# Patient Record
Sex: MALE | Race: WHITE | NOT HISPANIC OR LATINO | Employment: FULL TIME | URBAN - METROPOLITAN AREA
[De-identification: names, ages, dates, MRNs, and addresses within clinical notes are randomized per-mention and may not be internally consistent; named-entity substitution may affect disease eponyms.]

---

## 2017-11-06 ENCOUNTER — OFFICE VISIT (OUTPATIENT)
Dept: FAMILY MEDICINE | Facility: CLINIC | Age: 52
End: 2017-11-06
Payer: COMMERCIAL

## 2017-11-06 VITALS
OXYGEN SATURATION: 98 % | HEART RATE: 85 BPM | SYSTOLIC BLOOD PRESSURE: 124 MMHG | TEMPERATURE: 98.5 F | DIASTOLIC BLOOD PRESSURE: 80 MMHG | BODY MASS INDEX: 25.16 KG/M2 | WEIGHT: 179.7 LBS | HEIGHT: 71 IN

## 2017-11-06 DIAGNOSIS — G25.81 RESTLESS LEG SYNDROME: ICD-10-CM

## 2017-11-06 DIAGNOSIS — Z82.49 FAMILY HISTORY OF ISCHEMIC HEART DISEASE: Primary | ICD-10-CM

## 2017-11-06 PROCEDURE — 99213 OFFICE O/P EST LOW 20 MIN: CPT | Performed by: NURSE PRACTITIONER

## 2017-11-06 RX ORDER — GABAPENTIN 100 MG/1
CAPSULE ORAL
Qty: 90 CAPSULE | Refills: 1 | Status: SHIPPED | OUTPATIENT
Start: 2017-11-06 | End: 2018-05-18

## 2017-11-06 NOTE — PATIENT INSTRUCTIONS
Understanding Restless Legs Syndrome    Are you ever annoyed by a creeping or itching feeling in your legs? Do you often feel an urge to move your legs while sitting or lying in bed? This can keep you from falling asleep at night. You may then feel tired during the day. If you have these problems, talk to your health care provider. He or she can suggest a treatment plan and help you find ways to sleep better.  Restless legs syndrome (RLS)  RLS is a creeping, crawly, or jumpy feeling in the legs with an urge to move them. Symptoms of RLS often occur during periods of inactivity, such as when you sit or lie down at night. This discomfort can keep you from falling asleep. RLS is more common in older people and tends to run in families. Overuse of caffeine or alcohol may make symptoms worse. Iron deficiency, diabetes, or kidney problems can contribute to RLS.  Periodic limb movement syndrome (PLMS)  PLMS is sudden, repetitive leg jerking during sleep. The person you sleep with is often the one who notices it. Your legs may jerk many times during the night. You and your partner may both have trouble sleeping and feel tired in the morning. PLMS shouldn t be confused with the normal leg or body twitching many people have when first falling asleep.  Treating these problems  If these problems are causing disrupted sleep and daytime symptoms, treatment may be needed. Possible treatments may include:    Avoiding medications like antidepressants, antinausea medications, and antipsychotic medications.     Prescribed medications.    Lifestyle changes, such as controlling caffeine intake, alcohol, and smoking.  Date Last Reviewed: 7/18/2015 2000-2017 [employer]. [address] [place] [address], [place] [address]. All rights reserved. This information is not intended as a substitute for professional medical care. Always follow your healthcare professional's instructions.

## 2017-11-06 NOTE — PROGRESS NOTES
SUBJECTIVE:   Miller Burkett is a 52 year old male who presents to clinic today for the following health issues:    Problem list and histories reviewed & adjusted, as indicated.    Has symptoms of needing to move legs before sleep. Has noticed this since he was a child, but thinks it has gotten worse in the past few years. He is very physically active, and typically stretches after exercise. Aches in calves in the evenings.Tries to avoid alcohol in the evenings as he has noticed this makes things a bit worse as well. Notices that occasionally he feels lik he needs to move his legs during the night when he wakes up.     His father and two brothers have CAD. He has not had any symptoms of shortness of breath, chest pain or pressure, irregular heart rate. His brother was also not symptomatic, but was diagnosed with CAD due to coronary calcium scoring- he is wondering if he could be evaluated in this way as well.   Additional history: as documented    Patient Active Problem List   Diagnosis     CARDIOVASCULAR SCREENING; LDL GOAL LESS THAN 160     Family history of cardiovascular disease     Past Surgical History:   Procedure Laterality Date     C APPENDECTOMY      age 13       Social History   Substance Use Topics     Smoking status: Never Smoker     Smokeless tobacco: Never Used     Alcohol use Yes      Comment: 2-3 drinks per wk     Family History   Problem Relation Age of Onset     Hypertension Father      C.A.D. Father      quintuple bypass (age 53); pacemaker     Anesthesia Reaction Father      Coronary Artery Disease Father      Arthritis Father      Hyperlipidemia Father      Asthma Mother      Thyroid Disease Mother      Arthritis Mother      MENTAL ILLNESS Mother      Coronary Artery Disease Paternal Grandfather      Asthma Brother      Coronary Artery Disease Brother      diagnosed with CAD with calcium scoring     MENTAL ILLNESS Brother      Asthma Sister      Arthritis Sister      Hyperlipidemia Sister   "    MENTAL ILLNESS Sister      ALETHEA. Brother      Alcohol/Drug Sister      CANCER Paternal Aunt      brain     CANCER Paternal Uncle      lung     Depression Sister      poss bi-polar     Anxiety Disorder Sister      RADHAAGLADIS. Sister      arrhythmia     OSTEOPOROSIS Sister              Reviewed and updated as needed this visit by clinical staff       Reviewed and updated as needed this visit by Provider         ROS:  Constitutional, HEENT, cardiovascular, pulmonary, gi and gu systems are negative, except as otherwise noted.      OBJECTIVE:   /80 (BP Location: Right arm, Patient Position: Sitting, Cuff Size: Adult Regular)  Pulse 85  Temp 98.5  F (36.9  C) (Oral)  Ht 5' 11\" (1.803 m)  Wt 179 lb 11.2 oz (81.5 kg)  SpO2 98%  BMI 25.06 kg/m2  Body mass index is 25.06 kg/(m^2).   GENERAL: healthy, alert and no distress  NECK: no adenopathy, no asymmetry, masses, or scars and thyroid normal to palpation  RESP: lungs clear to auscultation - no rales, rhonchi or wheezes  CV: regular rate and rhythm, normal S1 S2, no S3 or S4, no murmur, click or rub, no peripheral edema and peripheral pulses strong  ABDOMEN: soft, nontender, no hepatosplenomegaly, no masses and bowel sounds normal  MS: no gross musculoskeletal defects noted, no edema    Diagnostic Test Results:  none     ASSESSMENT/PLAN:     Problem List Items Addressed This Visit     None      Visit Diagnoses     Family history of ischemic heart disease    -  Primary    Relevant Orders    CARDIOLOGY EVAL ADULT REFERRAL    Restless leg syndrome        Relevant Medications    gabapentin (NEURONTIN) 100 MG capsule             "

## 2017-11-06 NOTE — MR AVS SNAPSHOT
After Visit Summary   11/6/2017    Miller Burkett    MRN: [id number]           Patient Information     Date Of Birth          1965        Visit Information        Provider Department      11/6/2017 3:00 PM Yvonne Franco APRN [facility]        Today's Diagnoses     Family history of ischemic heart disease    -  1    Restless leg syndrome          Care Instructions      Understanding Restless Legs Syndrome    Are you ever annoyed by a creeping or itching feeling in your legs? Do you often feel an urge to move your legs while sitting or lying in bed? This can keep you from falling asleep at night. You may then feel tired during the day. If you have these problems, talk to your health care provider. He or she can suggest a treatment plan and help you find ways to sleep better.  Restless legs syndrome (RLS)  RLS is a creeping, crawly, or jumpy feeling in the legs with an urge to move them. Symptoms of RLS often occur during periods of inactivity, such as when you sit or lie down at night. This discomfort can keep you from falling asleep. RLS is more common in older people and tends to run in families. Overuse of caffeine or alcohol may make symptoms worse. Iron deficiency, diabetes, or kidney problems can contribute to RLS.  Periodic limb movement syndrome (PLMS)  PLMS is sudden, repetitive leg jerking during sleep. The person you sleep with is often the one who notices it. Your legs may jerk many times during the night. You and your partner may both have trouble sleeping and feel tired in the morning. PLMS shouldn t be confused with the normal leg or body twitching many people have when first falling asleep.  Treating these problems  If these problems are causing disrupted sleep and daytime symptoms, treatment may be needed. Possible treatments may include:    Avoiding medications like antidepressants, antinausea medications, and antipsychotic medications.     Prescribed  medications.    Lifestyle changes, such as controlling caffeine intake, alcohol, and smoking.  Date Last Reviewed: 7/18/2015 2000-2017 [employer]. [address] [place] [address], [place] [address]. All rights reserved. This information is not intended as a substitute for professional medical care. Always follow your healthcare professional's instructions.                Follow-ups after your visit        Additional Services     CARDIOLOGY EVAL ADULT REFERRAL       Your provider has referred you to:  [facility] ([phone]) [email or web address]  [facility]: [place] [phone]   [email or web address]/[facility]: [facility] [phone]   [email or web address]/[facility]: [facility] [phone]   https://[email or web address]/[facility]: [facility] [phone]   [email or web address]/[facility]: [facility] [phone]   [email or web address]://[email or web address]/[facility]  [facility]: [facility] CVD Prevention- [place] [phone] [email or web address] [facility]-for-cardiovascular-disease-prevention  [facility]: [facility] [phone]   [email or web address]    Please be aware that coverage of these services is subject to the terms and limitations of your health insurance plan.  Call member services at your health plan with any benefit or coverage questions.      Type of Referral:  Heart Prevention Screening ([facility])    Timeframe requested:  Within 1  "month    Please bring the following to your appointment:  >>   Any x-rays, CTs or MRIs which have been performed.  Contact the facility where they were done to arrange for  prior to your scheduled appointment.    >>   List of current medications  >>   This referral request   >>   Any documents/labs given to you for this referral                  Who to contact     If you have questions or need follow up information about today's clinic visit or your schedule please contact [facility] directly at [phone].  Normal or non-critical lab and imaging results will be communicated to you by [employer], letter or phone within 4 business days after the clinic has received the results. If you do not hear from us within 7 days, please contact the clinic through [employer] or phone. If you have a critical or abnormal lab result, we will notify you by phone as soon as possible.  Submit refill requests through [employer] or call your pharmacy and they will forward the refill request to us. Please allow 3 business days for your refill to be completed.          Additional Information About Your Visit        [employer] Information     [employer] gives you secure access to your electronic health record. If you see a primary care provider, you can also send messages to your care team and make appointments. If you have questions, please call your primary care clinic.  If you do not have a primary care provider, please call [phone] and they will assist you.        Care EveryWhere ID     This is your Care EveryWhere ID. This could be used by other organizations to access your [place] medical records  [id number]        Your Vitals Were     Pulse Temperature Height Pulse Oximetry BMI (Body Mass Index)       85 98.5  F (36.9  C) (Oral) 5' 11\" (1.803 m) 98% 25.06 kg/m2        Blood Pressure from Last 3 Encounters:   11/06/17 124/80   02/04/16 125/77   01/14/09 116/58    Weight from Last 3 Encounters:   11/06/17 179 lb 11.2 " oz (81.5 kg)   02/04/16 182 lb 12.8 oz (82.9 kg)   01/14/09 179 lb 4 oz (81.3 kg)              We Performed the Following     CARDIOLOGY EVAL ADULT REFERRAL          Today's Medication Changes          These changes are accurate as of: 11/6/17  3:45 PM.  If you have any questions, ask your nurse or doctor.               Start taking these medicines.        Dose/Directions    gabapentin 100 MG capsule   Commonly known as:  NEURONTIN   Used for:  Restless leg syndrome   Started by:  Yvonne Franco APRN CNP        Take 1-3 tablets two hours before bedtime   Quantity:  90 capsule   Refills:  1            Where to get your medicines      These medications were sent to [facility] [address] [place] [address] & [address] [address]    Hours:  24-hours Phone:  [phone]     gabapentin 100 MG capsule                Primary Care Provider Office Phone # Fax #    Woodrow Ryan [phone]       [facility] [address] [place] [address]        Equal Access to Services     [facility] AH: Hadii aad ku hadasho Soomaali, waaxda luqadaha, qaybta kaalmada ademarceloyada, griffin martinez. So [facility] [phone].    ATENCIÓN: Si habla español, tiene a aj disposición servicios gratuitos de asistencia lingüística. [facility] [phone].    We comply with applicable federal civil rights laws and [place] laws. We do not discriminate on the basis of race, color, national origin, age, disability, sex, sexual orientation, or gender identity.            Thank you!     Thank you for choosing [facility]  for your care. Our goal is always to provide you with excellent care. Hearing back from our patients is one way we can continue to improve our services. Please take a few minutes to complete the written survey that you may receive in the mail after your visit with us. Thank  you!             Your Updated Medication List - Protect others around you: Learn how to safely use, store and throw away your medicines at www.disposemymeds.org.          This list is accurate as of: 11/6/17  3:45 PM.  Always use your most recent med list.                   Brand Name Dispense Instructions for use Diagnosis    gabapentin 100 MG capsule    NEURONTIN    90 capsule    Take 1-3 tablets two hours before bedtime    Restless leg syndrome       NO ACTIVE MEDICATIONS      .

## 2017-11-29 ENCOUNTER — OFFICE VISIT (OUTPATIENT)
Dept: CARDIOLOGY | Facility: CLINIC | Age: 52
End: 2017-11-29
Payer: COMMERCIAL

## 2017-11-29 VITALS
OXYGEN SATURATION: 98 % | WEIGHT: 181.2 LBS | BODY MASS INDEX: 25.27 KG/M2 | SYSTOLIC BLOOD PRESSURE: 120 MMHG | HEART RATE: 56 BPM | DIASTOLIC BLOOD PRESSURE: 70 MMHG

## 2017-11-29 DIAGNOSIS — Z82.49 FAMILY HISTORY OF CORONARY ARTERIOSCLEROSIS: ICD-10-CM

## 2017-11-29 DIAGNOSIS — Z13.6 CARDIOVASCULAR SCREENING; LDL GOAL LESS THAN 160: Primary | ICD-10-CM

## 2017-11-29 PROCEDURE — 93000 ELECTROCARDIOGRAM COMPLETE: CPT | Performed by: INTERNAL MEDICINE

## 2017-11-29 PROCEDURE — 99204 OFFICE O/P NEW MOD 45 MIN: CPT | Performed by: INTERNAL MEDICINE

## 2017-11-29 NOTE — LETTER
11/29/2017    Yvonne Allysury Franco, APRN CNP  [address]  [place] [address]    RE: Miller Burkett       Dear Colleague,    I had the pleasure of seeing Miller Burkett in the [facility].    CARDIOLOGY CONSULT    REASON FOR CONSULT: Family history of urinary artery disease    PRIMARY CARE PHYSICIAN:  Woodrow Ryan    HISTORY OF PRESENT ILLNESS:  52-year-old male with no cardiac history is seen for cardiovascular screening.  He is a lifelong nonsmoker, he has no high blood pressure or dyslipidemia.    He has a strong family history of coronary artery disease.  His father had a CABG at age 53.  He has 2 brothers who are found of an elevated calcium score, 1 underwent a stent.    Patient is very physically active.  He does triathelons and will generally exercises 4 times per week for about one hour.  He has not had any exertional shortness of breath or chest pain.  He denies any decreased functional capacity recently with his exercise.  He generally tries to eat a healthy diet with minimizing fatty food and red meats.  He does not check his blood pressure or heart rate outside of clinic.    PAST MEDICAL HISTORY:  No significant past medical history    MEDICATIONS:  Current Outpatient Prescriptions   Medication     gabapentin (NEURONTIN) 100 MG capsule     NO ACTIVE MEDICATIONS     No current facility-administered medications for this visit.        ALLERGIES:  Allergies   Allergen Reactions     No Known Drug Allergies      Seasonal Allergies        SOCIAL HISTORY:  I have reviewed this patient's social history and updated it with pertinent information if needed. Miller Burkett  reports that he has never smoked. He has never used smokeless tobacco. He reports that he drinks alcohol. He reports that he does not use illicit drugs.    FAMILY HISTORY:  I have reviewed this patient's family history and updated it with pertinent information if needed.   Family History   Problem  Relation Age of Onset     Hypertension Father      C.A.D. Father      quintuple bypass (age 53); pacemaker     Anesthesia Reaction Father      Coronary Artery Disease Father      Arthritis Father      Hyperlipidemia Father      Asthma Mother      Thyroid Disease Mother      Arthritis Mother      MENTAL ILLNESS Mother      Coronary Artery Disease Paternal Grandfather      Asthma Brother      Coronary Artery Disease Brother      diagnosed with CAD with calcium scoring     MENTAL ILLNESS Brother      Asthma Sister      Arthritis Sister      Hyperlipidemia Sister      MENTAL ILLNESS Sister      C.A.D. Brother      Alcohol/Drug Sister      CANCER Paternal Aunt      brain     CANCER Paternal Uncle      lung     Depression Sister      poss bi-polar     Anxiety Disorder Sister      C.A.D. Sister      arrhythmia     OSTEOPOROSIS Sister        REVIEW OF SYSTEMS:  Constitutional:  No weight loss, fever, chills, weakness or fatigue.  HEENT:  Eyes:  No visual loss, blurred vision, double vision or yellow sclerae. No hearing loss, sneezing, congestion, runny nose or sore throat.  Skin:  No rash or itching.  Cardiovascular: per HPI  Respiratory: per HPI  GI:  No anorexia, nausea, vomiting or diarrhea. No abdominal pain or blood.  :  No dysurea, hematuria  Neurologic:  No headache, dizziness, syncope, paralysis, ataxia, numbness or tingling in the extremities. No change in bowel or bladder control.  Musculoskeletal:  No muscle, back pain, joint pain or stiffness.  Hematologic:  No anemia, bleeding or bruising.  Lymphatics:  No enlarged nodes. No history of splenectomy.  Psychiatric:  No history of depression or anxiety.  Endocrine:  No reports of sweating, cold or heat intolerance. No polyuria or polydipsia.  Allergies:  No history of asthma, hives, eczema or rhinitis.    PHYSICAL EXAM:  /70 (BP Location: Right arm, Patient Position: Chair, Cuff Size: Adult Regular)  Pulse 56  Wt 82.2 kg (181 lb 3.2 oz)  SpO2 98%  BMI  25.27 kg/m2  Constitutional: awake, alert, no distress  Eyes: PERRL, sclera nonicteric  ENT: trachea midline  Respiratory: Lungs clear  Cardiovascular: Mild bradycardia, regular, no murmurs  GI: nondistended, nontender, bowel sounds present  Lymph/Hematologic: no lymphadenopathy  Skin: dry, no rash  Musculoskeletal: good muscle tone, strength 5/5 in upper and lower extremities  Neurologic: no focal deficits  Neuropsychiatric: appropriate affact    DATA:  Labs:   February 2016: Cholesterol 180, triglycerides 80, HDL 60,      EKG: November 29, 2017: Sinus bradycardia, rate 52, normal axis and intervals, no Q waves    ASSESSMENT:  52-year-old male with no cardiac history is seen for cardiovascular screening.  He has a very strong family history of premature coronary artery disease in his father and 2 brothers.  Patient is very physically active and has no concerning cardiac symptoms.  He has no other risk factors such as hypertension, diabetes, or dyslipidemia.    A coronary calcium scan will be done as a starting point to evaluate for coronary disease.  If it comes back quite high, such as over 400, or at a very high percentile, additional testing such as a stress test or coronary CTA would be reasonable.    He should probably be on low-dose aspirin.  We also discussed that if his calcium score is elevated, there would be a benefit being on a statin, even though his lipids look quite good.  He seems to already eat a fairly healthy diet.      RECOMMENDATIONS:  1.  Strong family history of coronary artery disease  - Coronary calcium score  - If score is quite high, such as over 400 above the 75th percentile, consider stress testing or coronary CTA  - Start aspirin 81 mg daily  - If calcium score is high, would recommend moderate dose statin such as atorvastatin 20 mg   - Encouraged Mediterranean-style diet     Follow-up as needed based on test results.    Hitesh De Santiago MD  Cardiology - [facility]  Pager:   [phone]  Text Page  November 29, 2017    Thank you for allowing me to participate in the care of your patient.    Sincerely,     Hitesh De Santiago MD     [facility]    cc:   Woodrow Ryan MD  [facility] [place]   [address] [place] [address]

## 2017-11-29 NOTE — NURSING NOTE
"Chief Complaint   Patient presents with     Consult     new pt for cardiovascular screening,family hx CAD       Initial /70 (BP Location: Right arm, Patient Position: Chair, Cuff Size: Adult Regular)  Pulse 56  Wt 82.2 kg (181 lb 3.2 oz)  SpO2 98%  BMI 25.27 kg/m2 Estimated body mass index is 25.27 kg/(m^2) as calculated from the following:    Height as of 11/6/17: 1.803 m (5' 11\").    Weight as of this encounter: 82.2 kg (181 lb 3.2 oz).  Medication Reconciliation: complete   Charito Lam MA    "

## 2017-11-29 NOTE — MR AVS SNAPSHOT
"              After Visit Summary   11/29/2017    Miller Burkett    MRN: [id number]           Patient Information     Date Of Birth          1965        Visit Information        Provider Department      11/29/2017 10:15 AM Hitesh De Santiago MD [facility]   [facility]        Today's Diagnoses     CARDIOVASCULAR SCREENING; LDL GOAL LESS THAN 160    -  1    Family history of coronary arteriosclerosis          Care Instructions    Getting Ready for a CT Coronary Calcium Scan  What is this test?  A calcium scoring CT (computed tomography) scan is a painless, highly sensitive test that shows the amount of calcium build-up in your heart arteries. This tells us your future risk for heart attack.  Any plaque that has started to form in the heart arteries will show up on the CT.  A \"calcium score\" is then calculated and is compared to others of your age and gender.  This test does not show the percent blockage in any given artery, but rather a \"global burden\" of plaque.  If the calcium score is very high, then other testing such as stress testing is sometimes recommended.  Will my insurance cover it?  Please check with your insurance plan. This is a screening test, which may or may not be covered by insurance.   How do I get ready for my exam?  It is best to avoid caffeine on the day of your test.   The entire exam will take about 30 minutes or less.   Be sure to tell your doctor:    If there s any chance you are pregnant.     If you have any special needs.  What happens during the exam?  Please wear loose clothing, such as a sweat suit or jogging clothes. Avoid snaps, zippers and other metal. We may ask you to undress and put on a hospital gown.     You will lie on a table that will move through the scanner. The scanner is a short tube.    You will not be enclosed. The scan takes only a few minutes.    You must lie very still during the scans and follow breathing instructions.  Is it " safe?  As with any scan that uses radiation, there is a very small increased risk of cancer. Your doctor orders a scan when he or she feels the potential benefits outweigh the risks of the scan. Please talk with your doctor if you have any concerns before your exam.  When will I know the results?  You should discuss your test with your family doctor. He or she can go over the results with you. If needed, he or she can also suggest treatment or lifestyle changes.              Follow-ups after your visit        Future tests that were ordered for you today     Open Future Orders        Priority Expected Expires Ordered    CT Coronary Calcium Scan Routine 11/30/2017 11/29/2018 11/29/2017            Who to contact     If you have questions or need follow up information about today's clinic visit or your schedule please contact [facility] directly at [phone].  Normal or non-critical lab and imaging results will be communicated to you by [place] [email or web address], letter or phone within 4 business days after the clinic has received the results. If you do not hear from us within 7 days, please contact the clinic through [place] [email or web address] or phone. If you have a critical or abnormal lab result, we will notify you by phone as soon as possible.  Submit refill requests through Quofore or call your pharmacy and they will forward the refill request to us. Please allow 3 business days for your refill to be completed.          Additional Information About Your Visit        Quofore Information     Quofore gives you secure access to your electronic health record. If you see a primary care provider, you can also send messages to your care team and make appointments. If you have questions, please call your primary care clinic.  If you do not have a primary care provider, please call [phone] and they will assist you.        Care EveryWhere ID     This is your Care EveryWhere ID. This could be used by other  organizations to access your [facility] medical records  [id number]        Your Vitals Were     Pulse Pulse Oximetry BMI (Body Mass Index)             56 98% 25.27 kg/m2          Blood Pressure from Last 3 Encounters:   11/29/17 120/70   11/06/17 124/80   02/04/16 125/77    Weight from Last 3 Encounters:   11/29/17 82.2 kg (181 lb 3.2 oz)   11/06/17 81.5 kg (179 lb 11.2 oz)   02/04/16 82.9 kg (182 lb 12.8 oz)              We Performed the Following     EKG 12-lead complete w/read - Clinics (performed today)        Primary Care Provider Office Phone # Fax #    Woodrow Ryan [phone]       [facility] [place] [address] [place] [address]        Equal Access to Services     [facility] Center[facility]ENIA : Hadii anali hopkins hadasho Soomaali, waaxda luqadaha, qaybta kaalmada ademarceloyada, griffin roldan . So [facility] [phone].    ATENCIÓN: Si habla español, tiene a aj disposición servicios gratuitos de asistencia lingüística. Dashawn al [phone].    We comply with applicable federal civil rights laws and [place] laws. We do not discriminate on the basis of race, color, national origin, age, disability, sex, sexual orientation, or gender identity.            Thank you!     Thank you for choosing [facility]   [facility]  for your care. Our goal is always to provide you with excellent care. Hearing back from our patients is one way we can continue to improve our services. Please take a few minutes to complete the written survey that you may receive in the mail after your visit with us. Thank you!             Your Updated Medication List - Protect others around you: Learn how to safely use, store and throw away your medicines at www.disposemymeds.org.          This list is accurate as of: 11/29/17 11:05 AM.  Always use your most recent med list.                   Brand Name Dispense Instructions for use Diagnosis    gabapentin 100 MG capsule    NEURONTIN     90 capsule    Take 1-3 tablets two hours before bedtime    Restless leg syndrome       NO ACTIVE MEDICATIONS      .

## 2017-11-29 NOTE — PATIENT INSTRUCTIONS
"Getting Ready for a CT Coronary Calcium Scan  What is this test?  A calcium scoring CT (computed tomography) scan is a painless, highly sensitive test that shows the amount of calcium build-up in your heart arteries. This tells us your future risk for heart attack.  Any plaque that has started to form in the heart arteries will show up on the CT.  A \"calcium score\" is then calculated and is compared to others of your age and gender.  This test does not show the percent blockage in any given artery, but rather a \"global burden\" of plaque.  If the calcium score is very high, then other testing such as stress testing is sometimes recommended.  Will my insurance cover it?  Please check with your insurance plan. This is a screening test, which may or may not be covered by insurance.   How do I get ready for my exam?  It is best to avoid caffeine on the day of your test.   The entire exam will take about 30 minutes or less.   Be sure to tell your doctor:    If there s any chance you are pregnant.     If you have any special needs.  What happens during the exam?  Please wear loose clothing, such as a sweat suit or jogging clothes. Avoid snaps, zippers and other metal. We may ask you to undress and put on a hospital gown.     You will lie on a table that will move through the scanner. The scanner is a short tube.    You will not be enclosed. The scan takes only a few minutes.    You must lie very still during the scans and follow breathing instructions.  Is it safe?  As with any scan that uses radiation, there is a very small increased risk of cancer. Your doctor orders a scan when he or she feels the potential benefits outweigh the risks of the scan. Please talk with your doctor if you have any concerns before your exam.  When will I know the results?  You should discuss your test with your family doctor. He or she can go over the results with you. If needed, he or she can also suggest treatment or lifestyle changes.      "

## 2017-11-29 NOTE — PROGRESS NOTES
CARDIOLOGY CONSULT    REASON FOR CONSULT: Family history of urinary artery disease    PRIMARY CARE PHYSICIAN:  Woodrow Ryan    HISTORY OF PRESENT ILLNESS:  52-year-old male with no cardiac history is seen for cardiovascular screening.  He is a lifelong nonsmoker, he has no high blood pressure or dyslipidemia.    He has a strong family history of coronary artery disease.  His father had a CABG at age 53.  He has 2 brothers who are found of an elevated calcium score, 1 underwent a stent.    Patient is very physically active.  He does triathelons and will generally exercises 4 times per week for about one hour.  He has not had any exertional shortness of breath or chest pain.  He denies any decreased functional capacity recently with his exercise.  He generally tries to eat a healthy diet with minimizing fatty food and red meats.  He does not check his blood pressure or heart rate outside of clinic.    PAST MEDICAL HISTORY:  No significant past medical history    MEDICATIONS:  Current Outpatient Prescriptions   Medication     gabapentin (NEURONTIN) 100 MG capsule     NO ACTIVE MEDICATIONS     No current facility-administered medications for this visit.        ALLERGIES:  Allergies   Allergen Reactions     No Known Drug Allergies      Seasonal Allergies        SOCIAL HISTORY:  I have reviewed this patient's social history and updated it with pertinent information if needed. Miller Burkett  reports that he has never smoked. He has never used smokeless tobacco. He reports that he drinks alcohol. He reports that he does not use illicit drugs.    FAMILY HISTORY:  I have reviewed this patient's family history and updated it with pertinent information if needed.   Family History   Problem Relation Age of Onset     Hypertension Father      C.A.D. Father      quintuple bypass (age 53); pacemaker     Anesthesia Reaction Father      Coronary Artery Disease Father      Arthritis Father      Hyperlipidemia Father      Asthma  Mother      Thyroid Disease Mother      Arthritis Mother      MENTAL ILLNESS Mother      Coronary Artery Disease Paternal Grandfather      Asthma Brother      Coronary Artery Disease Brother      diagnosed with CAD with calcium scoring     MENTAL ILLNESS Brother      Asthma Sister      Arthritis Sister      Hyperlipidemia Sister      MENTAL ILLNESS Sister      C.A.D. Brother      Alcohol/Drug Sister      CANCER Paternal Aunt      brain     CANCER Paternal Uncle      lung     Depression Sister      poss bi-polar     Anxiety Disorder Sister      C.A.D. Sister      arrhythmia     OSTEOPOROSIS Sister        REVIEW OF SYSTEMS:  Constitutional:  No weight loss, fever, chills, weakness or fatigue.  HEENT:  Eyes:  No visual loss, blurred vision, double vision or yellow sclerae. No hearing loss, sneezing, congestion, runny nose or sore throat.  Skin:  No rash or itching.  Cardiovascular: per HPI  Respiratory: per HPI  GI:  No anorexia, nausea, vomiting or diarrhea. No abdominal pain or blood.  :  No dysurea, hematuria  Neurologic:  No headache, dizziness, syncope, paralysis, ataxia, numbness or tingling in the extremities. No change in bowel or bladder control.  Musculoskeletal:  No muscle, back pain, joint pain or stiffness.  Hematologic:  No anemia, bleeding or bruising.  Lymphatics:  No enlarged nodes. No history of splenectomy.  Psychiatric:  No history of depression or anxiety.  Endocrine:  No reports of sweating, cold or heat intolerance. No polyuria or polydipsia.  Allergies:  No history of asthma, hives, eczema or rhinitis.    PHYSICAL EXAM:  /70 (BP Location: Right arm, Patient Position: Chair, Cuff Size: Adult Regular)  Pulse 56  Wt 82.2 kg (181 lb 3.2 oz)  SpO2 98%  BMI 25.27 kg/m2  Constitutional: awake, alert, no distress  Eyes: PERRL, sclera nonicteric  ENT: trachea midline  Respiratory: Lungs clear  Cardiovascular: Mild bradycardia, regular, no murmurs  GI: nondistended, nontender, bowel sounds  present  Lymph/Hematologic: no lymphadenopathy  Skin: dry, no rash  Musculoskeletal: good muscle tone, strength 5/5 in upper and lower extremities  Neurologic: no focal deficits  Neuropsychiatric: appropriate affact    DATA:  Labs:   February 2016: Cholesterol 180, triglycerides 80, HDL 60,      EKG: November 29, 2017: Sinus bradycardia, rate 52, normal axis and intervals, no Q waves    ASSESSMENT:  52-year-old male with no cardiac history is seen for cardiovascular screening.  He has a very strong family history of premature coronary artery disease in his father and 2 brothers.  Patient is very physically active and has no concerning cardiac symptoms.  He has no other risk factors such as hypertension, diabetes, or dyslipidemia.    A coronary calcium scan will be done as a starting point to evaluate for coronary disease.  If it comes back quite high, such as over 400, or at a very high percentile, additional testing such as a stress test or coronary CTA would be reasonable.    He should probably be on low-dose aspirin.  We also discussed that if his calcium score is elevated, there would be a benefit being on a statin, even though his lipids look quite good.  He seems to already eat a fairly healthy diet.      RECOMMENDATIONS:  1.  Strong family history of coronary artery disease  - Coronary calcium score  - If score is quite high, such as over 400 above the 75th percentile, consider stress testing or coronary CTA  - Start aspirin 81 mg daily  - If calcium score is high, would recommend moderate dose statin such as atorvastatin 20 mg   - Encouraged Mediterranean-style diet     Follow-up as needed based on test results.    Hitesh De Santiago MD  Cardiology - [facility]  Pager:  [phone]  Text Page  November 29, 2017

## 2017-12-08 ENCOUNTER — HOSPITAL ENCOUNTER (OUTPATIENT)
Dept: CT IMAGING | Facility: CLINIC | Age: 52
Discharge: HOME | End: 2017-12-08
Attending: INTERNAL MEDICINE | Admitting: INTERNAL MEDICINE
Payer: COMMERCIAL

## 2017-12-08 DIAGNOSIS — Z82.49 FAMILY HISTORY OF CORONARY ARTERIOSCLEROSIS: ICD-10-CM

## 2017-12-08 PROCEDURE — 75571 CT HRT W/O DYE W/CA TEST: CPT | Mod: 26 | Performed by: INTERNAL MEDICINE

## 2017-12-08 PROCEDURE — 75571 CT HRT W/O DYE W/CA TEST: CPT | Mod: GA

## 2017-12-11 ENCOUNTER — CARE COORDINATION (OUTPATIENT)
Dept: CARDIOLOGY | Facility: CLINIC | Age: 52
End: 2017-12-11

## 2017-12-11 DIAGNOSIS — R93.1 ELEVATED CORONARY ARTERY CALCIUM SCORE: Primary | ICD-10-CM

## 2017-12-11 DIAGNOSIS — G25.81 RESTLESS LEG SYNDROME: ICD-10-CM

## 2017-12-11 NOTE — PROGRESS NOTES
"Attempted to call pt to review coronary calcium score results. No answer, left vm to call me back.     CT Calcium screening showed total score 318. Last OV 11/29/17 Dr. De Santiago noted \" strong family history of coronary artery disease. If score is quite high, such as over 400 above the 75th percentile, consider stress testing or coronary CTA. Start aspirin 81 mg daily. If calcium score is high, would recommend moderate dose statin such as atorvastatin 20 mg\". Dr. De Santiaog reviewed the results and noted \"His calcium score is high, at the 96th percentile for his age and gender.  I would recommend a treadmill stress echo.  Then a follow-up appointment with me\". Orders entered for treadmill stress test and f/u with Dr. De Santiago.       Update - pt called back, reviewed results and recommendations. Pt understood and was transferred to scheduling to setup stress echo and f/u.   Nav PUTNAM    "

## 2017-12-12 RX ORDER — GABAPENTIN 100 MG/1
CAPSULE ORAL
Qty: 90 CAPSULE | Refills: 0 | OUTPATIENT
Start: 2017-12-12

## 2017-12-12 NOTE — TELEPHONE ENCOUNTER
Controlled Substance Refill Request for gabapentin (NEURONTIN) 100 MG capsule  Problem List Complete:  No     PROVIDER TO CONSIDER COMPLETION OF PROBLEM LIST AND OVERVIEW/CONTROLLED SUBSTANCE AGREEMENT    Last Written Prescription Date:  11/6/17  Last Fill Quantity: 90,   # refills: 1    Last Office Visit with FM primary care provider: 11/6/17    Future Office visit:   Next 5 appointments (look out 90 days)     Dec 14, 2017 10:30 AM CST   Office Visit with SUELLEN Young CNP   [facility]    [address] [place] [address] [address]  [facility] [address] [phone]            Jan 03, 2018  7:45 AM CST   Return Visit with Hitesh De Santiago MD   [facility] ([facility])    [address] [address] [phone]                  Controlled substance agreement on file: no     Processing:  n/a     checked in past 6 months?  No, route to RN

## 2017-12-12 NOTE — TELEPHONE ENCOUNTER
Refill on script from 11/6/17. Instructed to use that refill.    CRISTINA SolanoN, RN  [facility]

## 2017-12-14 ENCOUNTER — OFFICE VISIT (OUTPATIENT)
Dept: FAMILY MEDICINE | Facility: CLINIC | Age: 52
End: 2017-12-14
Payer: COMMERCIAL

## 2017-12-14 VITALS
DIASTOLIC BLOOD PRESSURE: 70 MMHG | WEIGHT: 185 LBS | SYSTOLIC BLOOD PRESSURE: 112 MMHG | TEMPERATURE: 96.4 F | BODY MASS INDEX: 25.8 KG/M2 | HEART RATE: 69 BPM | OXYGEN SATURATION: 98 %

## 2017-12-14 DIAGNOSIS — Z12.11 SCREEN FOR COLON CANCER: ICD-10-CM

## 2017-12-14 DIAGNOSIS — R91.8 PULMONARY NODULES: Primary | ICD-10-CM

## 2017-12-14 DIAGNOSIS — Z11.59 NEED FOR HEPATITIS C SCREENING TEST: ICD-10-CM

## 2017-12-14 PROCEDURE — 99213 OFFICE O/P EST LOW 20 MIN: CPT | Performed by: NURSE PRACTITIONER

## 2017-12-14 NOTE — MR AVS SNAPSHOT
After Visit Summary   12/14/2017    Miller Burkett    MRN: [id number]           Patient Information     Date Of Birth          1965        Visit Information        Provider Department      12/14/2017 10:30 AM Yvonne Franco APRN [facility]        Today's Diagnoses     Pulmonary nodules    -  1    Screen for colon cancer        Need for hepatitis C screening test           Follow-ups after your visit        Your next 10 appointments already scheduled     Dec 18, 2017  2:00 PM CST   Cardiac Stress Test with RH TREADMILL   [facility] ([facility])    [address]  [facility] [address]   [phone]           1. Please bring or wear a comfortable two-piece outfit and walking shoes. 2. Stop eating 3 hours before the test. You may drink water or juice. 3. Stop all caffeine 12 hours before the test. This includes coffee, tea, soda pop, chocolate and certain medicines (such as Anacin and Excederin). Also avoid decaf coffee and tea, as these contain small amounts of caffeine. 4. No alcohol, smoking or use of other tobacco products for 12 hours before the test. 5. Refer to your provider instructions to see if you need to stop any medications (such as beta-blockers or nitrates) for this test. 6. For patients with diabetes: - If you take insulin, call your diabetes care team. Ask if you should take a   dose the morning of your test. - If you take diabetes medicine by mouth, don't take it on the morning of your test. Bring it with you to take after the test. (If you have questions, call your diabetes care team) 7. When you arrive, please tell us if: -You have diabetes. -You have taken Viagra, Cialis or Levitra in the past 48 hours. 8. For any questions that cannot be answered, please contact the ordering physician            Jan 03, 2018  7:45 AM CST   Return Visit with Hitesh De Santiago MD   [facility]  [facility] ([facility])    [address]  [facility] [address] [phone]              Who to contact     If you have questions or need follow up information about today's clinic visit or your schedule please contact [facility] directly at [phone].  Normal or non-critical lab and imaging results will be communicated to you by MyChart, letter or phone within 4 business days after the clinic has received the results. If you do not hear from us within 7 days, please contact the clinic through MyChart or phone. If you have a critical or abnormal lab result, we will notify you by phone as soon as possible.  Submit refill requests through Appdra or call your pharmacy and they will forward the refill request to us. Please allow 3 business days for your refill to be completed.          Additional Information About Your Visit        MyChart Information     Appdra gives you secure access to your electronic health record. If you see a primary care provider, you can also send messages to your care team and make appointments. If you have questions, please call your primary care clinic.  If you do not have a primary care provider, please call [phone] and they will assist you.        Care EveryWhere ID     This is your Care EveryWhere ID. This could be used by other organizations to access your [facility] medical records  [id number]        Your Vitals Were     Pulse Temperature Pulse Oximetry BMI (Body Mass Index)          69 96.4  F (35.8  C) (Oral) 98% 25.8 kg/m2         Blood Pressure from Last 3 Encounters:   12/14/17 112/70   11/29/17 120/70   11/06/17 124/80    Weight from Last 3 Encounters:   12/14/17 185 lb (83.9 kg)   11/29/17 181 lb 3.2 oz (82.2 kg)   11/06/17 179 lb 11.2 oz (81.5 kg)              Today, you had the following     No orders found for display       Primary Care Provider Office Phone # Fax #    Woodrow Ryan [phone]        [facility] [address] [place] [address]        Equal Access to Services     CASSIDARRYL MATHEW : Hadii aad ku hadmichaeldomenic Evans, wakacyda bhavesh, qaphu lunamagauri ayala, griffin crossmeghahans martinez. So [facility] [phone].    ATENCIÓN: Si habla español, tiene a aj disposición servicios gratuitos de asistencia lingüística. Llame al [phone].    We comply with applicable federal civil rights laws and [place] laws. We do not discriminate on the basis of race, color, national origin, age, disability, sex, sexual orientation, or gender identity.            Thank you!     Thank you for choosing [facility]  for your care. Our goal is always to provide you with excellent care. Hearing back from our patients is one way we can continue to improve our services. Please take a few minutes to complete the written survey that you may receive in the mail after your visit with us. Thank you!             Your Updated Medication List - Protect others around you: Learn how to safely use, store and throw away your medicines at www.disposemymeds.org.          This list is accurate as of: 12/14/17 11:12 AM.  Always use your most recent med list.                   Brand Name Dispense Instructions for use Diagnosis    gabapentin 100 MG capsule    NEURONTIN    90 capsule    Take 1-3 tablets two hours before bedtime    Restless leg syndrome       NO ACTIVE MEDICATIONS      .

## 2017-12-14 NOTE — PROGRESS NOTES
SUBJECTIVE:   Miller Burkett is a 52 year old male who presents to clinic today for the following health issues:    Follow up from CT calcium screening   Results were abnormal and found some spots on lungs, 2 under 2 mm. He has never been a smoker. Was treated for exposure to tuberculosis as a child. No symptoms of cough, weight loss, blood in cough.     -------------------------------------    Problem list and histories reviewed & adjusted, as indicated.  Additional history: as documented    Patient Active Problem List   Diagnosis     CARDIOVASCULAR SCREENING; LDL GOAL LESS THAN 160     Family history of cardiovascular disease     Past Surgical History:   Procedure Laterality Date     C APPENDECTOMY      age 13       Social History   Substance Use Topics     Smoking status: Never Smoker     Smokeless tobacco: Never Used     Alcohol use Yes      Comment: 2-3 drinks per wk     Family History   Problem Relation Age of Onset     Hypertension Father      C.A.D. Father      quintuple bypass (age 53); pacemaker     Anesthesia Reaction Father      Coronary Artery Disease Father      Arthritis Father      Hyperlipidemia Father      Asthma Mother      Thyroid Disease Mother      Arthritis Mother      MENTAL ILLNESS Mother      Coronary Artery Disease Brother      Coronary Artery Disease Paternal Grandfather      Asthma Brother      Coronary Artery Disease Brother      diagnosed with CAD with calcium scoring     MENTAL ILLNESS Brother      Asthma Sister      Arthritis Sister      Hyperlipidemia Sister      MENTAL ILLNESS Sister      C.A.D. Brother      Alcohol/Drug Sister      CANCER Paternal Aunt      brain     CANCER Paternal Uncle      lung     Depression Sister      poss bi-polar     Anxiety Disorder Sister      C.A.D. Sister      arrhythmia     OSTEOPOROSIS Sister              Reviewed and updated as needed this visit by clinical staff  Allergies  Meds       Reviewed and updated as needed this visit by Provider          ROS:  Constitutional, HEENT, cardiovascular, pulmonary, gi and gu systems are negative, except as otherwise noted.      OBJECTIVE:   /70  Pulse 69  Temp 96.4  F (35.8  C) (Oral)  Wt 185 lb (83.9 kg)  SpO2 98%  BMI 25.8 kg/m2  Body mass index is 25.8 kg/(m^2).   GENERAL: healthy, alert and no distress  NECK: no adenopathy, no asymmetry, masses, or scars and thyroid normal to palpation  RESP: lungs clear to auscultation - no rales, rhonchi or wheezes  CV: regular rate and rhythm, normal S1 S2, no S3 or S4, no murmur, click or rub, no peripheral edema and peripheral pulses strong  MS: no gross musculoskeletal defects noted, no edema    Diagnostic Test Results:  none     ASSESSMENT/PLAN:     Problem List Items Addressed This Visit     None      Visit Diagnoses     Pulmonary nodules    -  Primary    Screen for colon cancer        Need for hepatitis C screening test             No follow up needed for pulmonary nodules due to small size and lack of risk factorsSUELLEN Young CNP  [facility]

## 2017-12-18 ENCOUNTER — HOSPITAL ENCOUNTER (OUTPATIENT)
Dept: CARDIOLOGY | Facility: CLINIC | Age: 52
Discharge: HOME | End: 2017-12-18
Attending: INTERNAL MEDICINE | Admitting: INTERNAL MEDICINE
Payer: COMMERCIAL

## 2017-12-18 DIAGNOSIS — R93.1 ELEVATED CORONARY ARTERY CALCIUM SCORE: ICD-10-CM

## 2017-12-18 PROCEDURE — 93018 CV STRESS TEST I&R ONLY: CPT | Performed by: INTERNAL MEDICINE

## 2017-12-18 PROCEDURE — 93017 CV STRESS TEST TRACING ONLY: CPT | Performed by: INTERNAL MEDICINE

## 2017-12-18 PROCEDURE — 93016 CV STRESS TEST SUPVJ ONLY: CPT | Performed by: INTERNAL MEDICINE

## 2017-12-19 ENCOUNTER — CARE COORDINATION (OUTPATIENT)
Dept: CARDIOLOGY | Facility: CLINIC | Age: 52
End: 2017-12-19

## 2017-12-19 NOTE — PROGRESS NOTES
Called pt to review normal stress test, no answer, left non-urgent vm to call me back.       Pt called back, reviewed results. Will f/u 1/3/18 as planned.   Nav PUTNAM

## 2017-12-28 ENCOUNTER — DOCUMENTATION ONLY (OUTPATIENT)
Dept: CARDIOLOGY | Facility: CLINIC | Age: 52
End: 2017-12-28

## 2018-01-03 ENCOUNTER — CARE COORDINATION (OUTPATIENT)
Dept: CARDIOLOGY | Facility: CLINIC | Age: 53
End: 2018-01-03

## 2018-01-03 ENCOUNTER — OFFICE VISIT (OUTPATIENT)
Dept: CARDIOLOGY | Facility: CLINIC | Age: 53
End: 2018-01-03
Attending: INTERNAL MEDICINE
Payer: COMMERCIAL

## 2018-01-03 VITALS
SYSTOLIC BLOOD PRESSURE: 120 MMHG | WEIGHT: 180 LBS | HEIGHT: 71 IN | BODY MASS INDEX: 25.2 KG/M2 | HEART RATE: 72 BPM | DIASTOLIC BLOOD PRESSURE: 76 MMHG

## 2018-01-03 DIAGNOSIS — R93.1 ELEVATED CORONARY ARTERY CALCIUM SCORE: ICD-10-CM

## 2018-01-03 DIAGNOSIS — E78.5 HYPERLIPIDEMIA LDL GOAL <100: Primary | ICD-10-CM

## 2018-01-03 LAB
ALT SERPL W P-5'-P-CCNC: 35 U/L (ref 0–70)
CHOLEST SERPL-MCNC: 254 MG/DL
HDLC SERPL-MCNC: 72 MG/DL
LDLC SERPL CALC-MCNC: 145 MG/DL
NONHDLC SERPL-MCNC: 182 MG/DL
TRIGL SERPL-MCNC: 185 MG/DL

## 2018-01-03 PROCEDURE — 80061 LIPID PANEL: CPT | Performed by: INTERNAL MEDICINE

## 2018-01-03 PROCEDURE — 99213 OFFICE O/P EST LOW 20 MIN: CPT | Performed by: INTERNAL MEDICINE

## 2018-01-03 PROCEDURE — 36415 COLL VENOUS BLD VENIPUNCTURE: CPT | Performed by: INTERNAL MEDICINE

## 2018-01-03 PROCEDURE — 84460 ALANINE AMINO (ALT) (SGPT): CPT | Performed by: INTERNAL MEDICINE

## 2018-01-03 RX ORDER — ATORVASTATIN CALCIUM 20 MG/1
20 TABLET, FILM COATED ORAL DAILY
Qty: 30 TABLET | Refills: 3 | Status: SHIPPED | OUTPATIENT
Start: 2018-01-03 | End: 2018-05-02

## 2018-01-03 NOTE — PROGRESS NOTES
CARDIOLOGY VISIT    REASON FOR VISIT: elevated calcium score    SUBJECTIVE:  52-year-old male with no cardiac history is seen for cardiovascular screening.  He is a lifelong nonsmoker, he has no high blood pressure or dyslipidemia.     He has a strong family history of coronary artery disease.  His father had a CABG at age 53.  He has 2 brothers with elevated calcium scores, 1 underwent a stent.     Patient is very physically active.  He does triathlons and will generally exercises 4 times per week for about one hour.  He has not had any exertional shortness of breath or chest pain.  He denies any decreased functional capacity recently with his exercise.  He generally tries to eat a healthy diet with minimizing fatty food and red meats.  He does not check his blood pressure or heart rate outside of clinic.     Coronary calcium score December 2017 of 318, 96 percentile. Treadmill stress test December 2017 showed 14 minutes, no ischemia on EKG, no symptoms.     He has no new symptoms since last visit.  He started taking low-dose aspirin.    MEDICATIONS:  Current Outpatient Prescriptions   Medication     aspirin 81 MG tablet     gabapentin (NEURONTIN) 100 MG capsule     No current facility-administered medications for this visit.        ALLERGIES:  Allergies   Allergen Reactions     No Known Drug Allergies      Seasonal Allergies        REVIEW OF SYSTEMS:  Constitutional:  No weight loss, fever, chills, weakness or fatigue.  HEENT:  Eyes:  No visual loss, blurred vision, double vision or yellow sclerae. No hearing loss, sneezing, congestion, runny nose or sore throat.  Skin:  No rash or itching.  Cardiovascular: per HPI  Respiratory: per HPI  GI:  No anorexia, nausea, vomiting or diarrhea. No abdominal pain or blood.  :  No dysurea, hematuria  Neurologic:  No headache, dizziness, syncope, paralysis, ataxia, numbness or tingling in the extremities. No change in bowel or bladder control.  Musculoskeletal:  No muscle,  "back pain, joint pain or stiffness.  Hematologic:  No anemia, bleeding or bruising.  Lymphatics:  No enlarged nodes. No history of splenectomy.  Psychiatric:  No history of depression or anxiety.  Endocrine:  No reports of sweating, cold or heat intolerance. No polyuria or polydipsia.  Allergies:  No history of asthma, hives, eczema or rhinitis.    PHYSICAL EXAM:  /76 (BP Location: Right arm, Patient Position: Sitting, Cuff Size: Adult Regular)  Pulse 72  Ht 1.803 m (5' 11\")  Wt 81.6 kg (180 lb)  BMI 25.1 kg/m2  Constitutional: awake, alert, no distress  Eyes: PERRL, sclera nonicteric  ENT: trachea midline  Respiratory: Lungs clear  Cardiovascular: Regular rate and rhythm, no murmurs  GI: nondistended, nontender, bowel sounds present  Lymph/Hematologic: no lymphadenopathy  Skin: dry, no rash  Musculoskeletal: good muscle tone, strength 5/5 in upper and lower extremities  Neurologic: no focal deficits  Neuropsychiatric: appropriate affact    DATA:  Lab: February 2016: Cholesterol 180, triglycerides 80, HDL 60,    January 2018: Cholesterol 254, triglycerides 185, HDL 72,     ASSESSMENT:  52-year-old male seen for elevated calcium score.  He is in the 96th percentile with his calcium score of 318.  He has no symptoms and is very physically active.  His treadmill stress test was entirely normal at a very high workload.  There is extremely low suspicion for any obstructive coronary disease or underlying ischemia.    We talked about aggressive risk factor modification going forward.  He already started low-dose aspirin.  There would be a benefit to being on a statin, despite his historically good lipid profile.  The pleotropic effects of statin were explained in detail.  The risk and benefit were explained as well.    Otherwise he is an avid exerciser.  He is not overweight.  He tends to eat a fairly good diet.    RECOMMENDATIONS:  1.  Elevated calcium score, no symptoms or evidence of ischemia  - " Aspirin 81 mg daily  - Start atorvastatin 20 mg daily, recheck lipids in 6 weeks    Follow-up in 1 year.    Hitesh De Santiago MD  Cardiology - [facility]  Pager:  [phone]  Text Page  January 3, 2018

## 2018-01-03 NOTE — LETTER
1/3/2018    Yvonne Franco, APRN CNP  [address]  [facility] [address]    RE: Miller Burkett       Dear Colleague,    I had the pleasure of seeing Miller Burkett in the [facility].    CARDIOLOGY VISIT    REASON FOR VISIT: elevated calcium score    SUBJECTIVE:  52-year-old male with no cardiac history is seen for cardiovascular screening.  He is a lifelong nonsmoker, he has no high blood pressure or dyslipidemia.     He has a strong family history of coronary artery disease.  His father had a CABG at age 53.  He has 2 brothers with elevated calcium scores, 1 underwent a stent.     Patient is very physically active.  He does triathlons and will generally exercises 4 times per week for about one hour.  He has not had any exertional shortness of breath or chest pain.  He denies any decreased functional capacity recently with his exercise.  He generally tries to eat a healthy diet with minimizing fatty food and red meats.  He does not check his blood pressure or heart rate outside of clinic.     Coronary calcium score December 2017 of 318, 96 percentile. Treadmill stress test December 2017 showed 14 minutes, no ischemia on EKG, no symptoms.     He has no new symptoms since last visit.  He started taking low-dose aspirin.    MEDICATIONS:  Current Outpatient Prescriptions   Medication     aspirin 81 MG tablet     gabapentin (NEURONTIN) 100 MG capsule     No current facility-administered medications for this visit.        ALLERGIES:  Allergies   Allergen Reactions     No Known Drug Allergies      Seasonal Allergies        REVIEW OF SYSTEMS:  Constitutional:  No weight loss, fever, chills, weakness or fatigue.  HEENT:  Eyes:  No visual loss, blurred vision, double vision or yellow sclerae. No hearing loss, sneezing, congestion, runny nose or sore throat.  Skin:  No rash or itching.  Cardiovascular: per HPI  Respiratory: per HPI  GI:  No anorexia, nausea, vomiting or diarrhea.  "No abdominal pain or blood.  :  No dysurea, hematuria  Neurologic:  No headache, dizziness, syncope, paralysis, ataxia, numbness or tingling in the extremities. No change in bowel or bladder control.  Musculoskeletal:  No muscle, back pain, joint pain or stiffness.  Hematologic:  No anemia, bleeding or bruising.  Lymphatics:  No enlarged nodes. No history of splenectomy.  Psychiatric:  No history of depression or anxiety.  Endocrine:  No reports of sweating, cold or heat intolerance. No polyuria or polydipsia.  Allergies:  No history of asthma, hives, eczema or rhinitis.    PHYSICAL EXAM:  /76 (BP Location: Right arm, Patient Position: Sitting, Cuff Size: Adult Regular)  Pulse 72  Ht 1.803 m (5' 11\")  Wt 81.6 kg (180 lb)  BMI 25.1 kg/m2  Constitutional: awake, alert, no distress  Eyes: PERRL, sclera nonicteric  ENT: trachea midline  Respiratory: Lungs clear  Cardiovascular: Regular rate and rhythm, no murmurs  GI: nondistended, nontender, bowel sounds present  Lymph/Hematologic: no lymphadenopathy  Skin: dry, no rash  Musculoskeletal: good muscle tone, strength 5/5 in upper and lower extremities  Neurologic: no focal deficits  Neuropsychiatric: appropriate affact    DATA:  Lab: February 2016: Cholesterol 180, triglycerides 80, HDL 60,    January 2018: Cholesterol 254, triglycerides 185, HDL 72,     ASSESSMENT:  52-year-old male seen for elevated calcium score.  He is in the 96th percentile with his calcium score of 318.  He has no symptoms and is very physically active.  His treadmill stress test was entirely normal at a very high workload.  There is extremely low suspicion for any obstructive coronary disease or underlying ischemia.    We talked about aggressive risk factor modification going forward.  He already started low-dose aspirin.  There would be a benefit to being on a statin, despite his historically good lipid profile.  The pleotropic effects of statin were explained in detail.  " The risk and benefit were explained as well.    Otherwise he is an avid exerciser.  He is not overweight.  He tends to eat a fairly good diet.    RECOMMENDATIONS:  1.  Elevated calcium score, no symptoms or evidence of ischemia  - Aspirin 81 mg daily  - Start atorvastatin 20 mg daily, recheck lipids in 6 weeks    Follow-up in 1 year.    Hitesh De Santiago MD  Cardiology - [facility]  Pager:  [phone]  Text Page  January 3, 2018    Thank you for allowing me to participate in the care of your patient.    Sincerely,     Hitesh De Santiago MD     [facility]

## 2018-01-03 NOTE — MR AVS SNAPSHOT
After Visit Summary   1/3/2018    Miller Burkett    MRN: [id number]           Patient Information     Date Of Birth          1965        Visit Information        Provider Department      1/3/2018 7:45 AM Hitesh De Santiago MD [facility]        Today's Diagnoses     Elevated coronary artery calcium score           Follow-ups after your visit        Additional Services     Follow-Up with Cardiologist                 Future tests that were ordered for you today     Open Future Orders        Priority Expected Expires Ordered    Follow-Up with Cardiologist Routine 1/3/2019 1/4/2019 1/3/2018            Who to contact     If you have questions or need follow up information about today's clinic visit or your schedule please contact [facility] directly at [phone].  Normal or non-critical lab and imaging results will be communicated to you by MyChart, letter or phone within 4 business days after the clinic has received the results. If you do not hear from us within 7 days, please contact the clinic through MyChart or phone. If you have a critical or abnormal lab result, we will notify you by phone as soon as possible.  Submit refill requests through "[employer] or call your pharmacy and they will forward the refill request to us. Please allow 3 business days for your refill to be completed.          Additional Information About Your Visit        MyChart Information     "[employer] gives you secure access to your electronic health record. If you see a primary care provider, you can also send messages to your care team and make appointments. If you have questions, please call your primary care clinic.  If you do not have a primary care provider, please call [phone] and they will assist you.        Care EveryWhere ID     This is your Care EveryWhere ID. This could be used by other organizations to access your  "[place] medical records  [id number]        Your Vitals Were     Pulse Height BMI (Body Mass Index)             72 1.803 m (5' 11\") 25.1 kg/m2          Blood Pressure from Last 3 Encounters:   01/03/18 120/76   12/14/17 112/70   11/29/17 120/70    Weight from Last 3 Encounters:   01/03/18 81.6 kg (180 lb)   12/14/17 83.9 kg (185 lb)   11/29/17 82.2 kg (181 lb 3.2 oz)              We Performed the Following     Follow-Up with Cardiologist        Primary Care Provider Office Phone # Fax #    Yvonne Canales Joan, APRN [facility] [phone]       [address] [address]        Equal Access to Services     [facility] CenterKSENIA : Hadii aad ku hadasho Soomaali, waaxda luqadaha, qaybta kaalmada adeegyada, waxay laurain haypatricia roldan . So [facility] [phone].    ATENCIÓN: Si habla español, tiene a aj disposición servicios gratuitos de asistencia lingüística. Dashawn al [phone].    We comply with applicable federal civil rights laws and [place] laws. We do not discriminate on the basis of race, color, national origin, age, disability, sex, sexual orientation, or gender identity.            Thank you!     Thank you for choosing [facility]  for your care. Our goal is always to provide you with excellent care. Hearing back from our patients is one way we can continue to improve our services. Please take a few minutes to complete the written survey that you may receive in the mail after your visit with us. Thank you!             Your Updated Medication List - Protect others around you: Learn how to safely use, store and throw away your medicines at www.disposemymeds.org.          This list is accurate as of: 1/3/18 11:01 AM.  Always use your most recent med list.                   Brand Name Dispense Instructions for use Diagnosis    aspirin 81 MG tablet      Take 81 mg by mouth daily        gabapentin 100 MG capsule    NEURONTIN    90 capsule    " Take 1-3 tablets two hours before bedtime    Restless leg syndrome

## 2018-01-03 NOTE — PROGRESS NOTES
"Called pt, reviewed labs drawn after same day OV w/ Dr. De Santiago. Per Dr. De Santiago \"LDL has gone up since last lipid panel.  I would like him to start atorvastatin 20 mg daily.  Recheck lipids and ALT in 6 weeks\". Orders entered for FLP/ALT, scheduled fasting lab 2/13/18. Script for atorvastatin 20mg daily sent to [facility] per pt request. Advised if any new symptoms or concerns, to call me back directly. Pt understood and had no questions.   Nav PUTNAM    "

## 2018-02-13 DIAGNOSIS — E78.5 HYPERLIPIDEMIA LDL GOAL <100: ICD-10-CM

## 2018-02-13 LAB
ALT SERPL W P-5'-P-CCNC: 35 U/L (ref 0–70)
CHOLEST SERPL-MCNC: 141 MG/DL
HDLC SERPL-MCNC: 68 MG/DL
LDLC SERPL CALC-MCNC: 53 MG/DL
NONHDLC SERPL-MCNC: 73 MG/DL
TRIGL SERPL-MCNC: 100 MG/DL

## 2018-02-13 PROCEDURE — 36415 COLL VENOUS BLD VENIPUNCTURE: CPT | Performed by: INTERNAL MEDICINE

## 2018-02-13 PROCEDURE — 80061 LIPID PANEL: CPT | Performed by: INTERNAL MEDICINE

## 2018-02-13 PROCEDURE — 84460 ALANINE AMINO (ALT) (SGPT): CPT | Performed by: INTERNAL MEDICINE

## 2018-05-02 DIAGNOSIS — E78.5 HYPERLIPIDEMIA LDL GOAL <100: ICD-10-CM

## 2018-05-02 RX ORDER — ATORVASTATIN CALCIUM 20 MG/1
20 TABLET, FILM COATED ORAL DAILY
Qty: 90 TABLET | Refills: 2 | Status: SHIPPED | OUTPATIENT
Start: 2018-05-02 | End: 2019-09-04 | Stop reason: DRUGHIGH

## 2018-05-02 NOTE — TELEPHONE ENCOUNTER
Received refill request for:  Atorvastatin  Last OV was: 1/3/2018 with Dr. De Santiago  Labs/EKG: last lipid 2/13/2018  F/U scheduled: orders in Epic for 1/2019  New script sent to: [facility]

## 2018-05-18 DIAGNOSIS — G25.81 RESTLESS LEG SYNDROME: ICD-10-CM

## 2018-05-21 RX ORDER — GABAPENTIN 100 MG/1
CAPSULE ORAL
Qty: 90 CAPSULE | Refills: 0 | Status: SHIPPED | OUTPATIENT
Start: 2018-05-21

## 2018-05-21 NOTE — TELEPHONE ENCOUNTER
Requested Prescriptions   Pending Prescriptions Disp Refills     gabapentin (NEURONTIN) 100 MG capsule [Pharmacy Med Name: GABAPENTIN 100MG CAPSULES] 90 capsule 0     Sig: TAKE 1 TO 3 CAPSULES BY MOUTH EVERY NIGHT 2 HOURS BEFORE BEDTIME    There is no refill protocol information for this order        Problem List Complete:  Yes    Last Written Prescription Date:  11/6/17  Last Fill Quantity: 90,   # refills: 1    Last Office Visit with [facility] primary care provider: 12/14/17    Clinic visit frequency required: unspecified     Future Office visit:     Controlled substance agreement on file: No.     Processing:  may be excribed   checked in past 6 months?  No, route to RN no chronic pain    Thank you,  Marcelino Aragon RN

## 2018-06-20 ENCOUNTER — TRANSFERRED RECORDS (OUTPATIENT)
Dept: HEALTH INFORMATION MANAGEMENT | Facility: CLINIC | Age: 53
End: 2018-06-20

## 2018-06-20 ENCOUNTER — NURSE TRIAGE (OUTPATIENT)
Dept: NURSING | Facility: CLINIC | Age: 53
End: 2018-06-20

## 2018-06-20 NOTE — TELEPHONE ENCOUNTER
Reason for Disposition    [1] SEVERE pain (e.g., excruciating, scale 8-10) AND [2] present > 1 hour    Additional Information    Negative: Passed out (i.e., lost consciousness, collapsed and was not responding)    Negative: Shock suspected (e.g., cold/pale/clammy skin, too weak to stand, low BP, rapid pulse)    Negative: Difficult to awaken or acting confused  (e.g., disoriented, slurred speech)    Negative: Sounds like a life-threatening emergency to the triager    Negative: Followed a major injury to the back (e.g., MVA, fall > 10 feet or 3 meters, penetrating injury, etc.)    Negative: Back pain or flank pain during pregnancy    Negative: Upper, mid or lower back pain that occurs mainly in the midline    Protocols used: FLANK PAIN-ADULT-AH

## 2018-06-20 NOTE — TELEPHONE ENCOUNTER
"\"I am having pretty intense back, abdominal, and groin pain on my right side.\" Symptoms starting 4 days ago, increasing today. Pain radiating from right flank into right testicle. Denies swelling. Patient rating pain a \"7-8\" on 0-10 pain scale. Ibuprofen 400 mg was taken at 11 a.m. With no improvement.   Per guidelines patient was advised to be seen in the Emergency Room. Caller verbalized understanding. Denies further questions.      Leonor Jeronimo RN  [facility]      "

## 2019-08-05 DIAGNOSIS — R93.1 ELEVATED CORONARY ARTERY CALCIUM SCORE: Primary | ICD-10-CM

## 2019-08-05 DIAGNOSIS — E78.5 HYPERLIPIDEMIA LDL GOAL <100: ICD-10-CM

## 2019-09-04 ENCOUNTER — OFFICE VISIT (OUTPATIENT)
Dept: CARDIOLOGY | Facility: CLINIC | Age: 54
End: 2019-09-04
Payer: COMMERCIAL

## 2019-09-04 VITALS
BODY MASS INDEX: 24.6 KG/M2 | DIASTOLIC BLOOD PRESSURE: 72 MMHG | SYSTOLIC BLOOD PRESSURE: 114 MMHG | WEIGHT: 176.4 LBS | OXYGEN SATURATION: 99 % | HEART RATE: 64 BPM

## 2019-09-04 DIAGNOSIS — E78.5 HYPERLIPIDEMIA LDL GOAL <100: ICD-10-CM

## 2019-09-04 DIAGNOSIS — E78.5 DYSLIPIDEMIA: Primary | ICD-10-CM

## 2019-09-04 DIAGNOSIS — R93.1 ELEVATED CORONARY ARTERY CALCIUM SCORE: ICD-10-CM

## 2019-09-04 LAB
ALT SERPL W P-5'-P-CCNC: 34 U/L (ref 0–70)
CHOLEST SERPL-MCNC: 217 MG/DL
HDLC SERPL-MCNC: 66 MG/DL
LDLC SERPL CALC-MCNC: 119 MG/DL
NONHDLC SERPL-MCNC: 151 MG/DL
TRIGL SERPL-MCNC: 162 MG/DL

## 2019-09-04 PROCEDURE — 99214 OFFICE O/P EST MOD 30 MIN: CPT | Performed by: INTERNAL MEDICINE

## 2019-09-04 PROCEDURE — 80061 LIPID PANEL: CPT | Performed by: INTERNAL MEDICINE

## 2019-09-04 PROCEDURE — 36415 COLL VENOUS BLD VENIPUNCTURE: CPT | Performed by: INTERNAL MEDICINE

## 2019-09-04 PROCEDURE — 84460 ALANINE AMINO (ALT) (SGPT): CPT | Performed by: INTERNAL MEDICINE

## 2019-09-04 RX ORDER — ATORVASTATIN CALCIUM 10 MG/1
10 TABLET, FILM COATED ORAL DAILY
Qty: 90 TABLET | Refills: 3 | Status: SHIPPED | OUTPATIENT
Start: 2019-09-04 | End: 2020-10-07

## 2019-09-04 NOTE — PATIENT INSTRUCTIONS
1. Start aspirin 81mg daily    2. Start atorvastatin 10mg daily    3. Have fasting labs drawn in 1-2 months.

## 2019-09-04 NOTE — LETTER
9/4/2019    Yvonne Franco, APRN CNP  [address]  [facility] [address]    RE: Miller Burkett       Dear Colleague,    I had the pleasure of seeing Miller Burkett in the [facility].    CARDIOLOGY VISIT    REASON FOR VISIT: coronary calcification    SUBJECTIVE:  54-year-old male with no cardiac history is seen for cardiovascular screening.  He is a lifelong nonsmoker, he has no high blood pressure or dyslipidemia.      He has a strong family history of coronary artery disease.  His father had a CABG at age 53.  He has 2 brothers with elevated calcium scores, 1 underwent a stent.      Coronary calcium score December 2017 of 318, 96 percentile. Treadmill stress test December 2017 showed 14 minutes, no ischemia on EKG, no symptoms.     He is extremely physically active exercising 8 hours or more per week.  He will do triathlons and has done a 20 mile run recently.  He denies any chest pain or shortness of breath with his symptoms.  He was on atorvastatin in 2018, but stopped it on his own.  He has made some dietary improvements recently.    MEDICATIONS:  Current Outpatient Medications   Medication     aspirin 81 MG tablet     atorvastatin (LIPITOR) 20 MG tablet     gabapentin (NEURONTIN) 100 MG capsule     No current facility-administered medications for this visit.        ALLERGIES:  Allergies   Allergen Reactions     No Known Drug Allergies      Seasonal Allergies        REVIEW OF SYSTEMS:  Constitutional:  No weight loss, fever, chills, weakness or fatigue.  HEENT:  Eyes:  No visual loss, blurred vision, double vision or yellow sclerae. No hearing loss, sneezing, congestion, runny nose or sore throat.  Skin:  No rash or itching.  Cardiovascular: per HPI  Respiratory: per HPI  GI:  No anorexia, nausea, vomiting or diarrhea. No abdominal pain or blood.  :  No dysurea, hematuria  Neurologic:  No headache, dizziness, syncope, paralysis, ataxia, numbness or tingling in the  extremities. No change in bowel or bladder control.  Musculoskeletal:  No muscle, back pain, joint pain or stiffness.  Hematologic:  No anemia, bleeding or bruising.  Lymphatics:  No enlarged nodes. No history of splenectomy.  Psychiatric:  No history of depression or anxiety.  Endocrine:  No reports of sweating, cold or heat intolerance. No polyuria or polydipsia.  Allergies:  No history of asthma, hives, eczema or rhinitis.    PHYSICAL EXAM:  /72 (BP Location: Right arm, Patient Position: Chair, Cuff Size: Adult Regular)   Pulse 64   Wt 80 kg (176 lb 6.4 oz)   SpO2 99%   BMI 24.60 kg/m     Constitutional: awake, alert, no distress  Eyes: PERRL, sclera nonicteric  ENT: trachea midline  Respiratory: Lungs clear  Cardiovascular: Bradycardic, regular rhythm, no murmurs  GI: nondistended, nontender, bowel sounds present  Lymph/Hematologic: no lymphadenopathy  Skin: dry, no rash  Musculoskeletal: good muscle tone, strength 5/5 in upper and lower extremities  Neurologic: no focal deficits  Neuropsychiatric: appropriate affact    DATA:  Lab:   Recent Labs   Lab Test 09/04/19  0815 02/13/18  0814   CHOL 217* 141   HDL 66 68   * 53   TRIG 162* 100     ASSESSMENT:  54-year-old male seen for follow-up of elevated calcium score.  He is extremely physically fit and has no concerning symptoms.  The main issue for him will be optimizing his risk factors.  His lipids are mildly elevated now that he is off a statin.  They did improve from his baseline with improvements in his diet.  We talked about the benefits of statin therapy in the context of an elevated calcium score.  Ideally LDL should be less than 100, preferably less than 70.  He is agreeable to going back on a low-dose of atorvastatin.    We also talked about the controversies of aspirin for primary prevention.  Since his calcium score is over the 90th percentile, it may be reasonable to error on the side of taking a low-dose  aspirin.    RECOMMENDATIONS:  1.  Elevated coronary calcium score  -Start atorvastatin 10 mg daily, goal LDL 70 or less, check lipids in 1 to 2 months  -Restart aspirin 81 mg daily  -Continue routine exercise    Follow-up in 1 year.  Consider repeat stress testing in few years.    Hitesh De Santiago MD  Cardiology - [facility]  Pager:  [phone]  Text Page  September 4, 2019      Thank you for allowing me to participate in the care of your patient.      Sincerely,     Hitesh De Santiago MD     [facility]    cc:   Hitesh De Santiago MD  [facility]  [address] [place] [address]

## 2019-09-04 NOTE — PROGRESS NOTES
CARDIOLOGY VISIT    REASON FOR VISIT: coronary calcification    SUBJECTIVE:  54-year-old male with no cardiac history is seen for cardiovascular screening.  He is a lifelong nonsmoker, he has no high blood pressure or dyslipidemia.      He has a strong family history of coronary artery disease.  His father had a CABG at age 53.  He has 2 brothers with elevated calcium scores, 1 underwent a stent.      Coronary calcium score December 2017 of 318, 96 percentile. Treadmill stress test December 2017 showed 14 minutes, no ischemia on EKG, no symptoms.     He is extremely physically active exercising 8 hours or more per week.  He will do triathlons and has done a 20 mile run recently.  He denies any chest pain or shortness of breath with his symptoms.  He was on atorvastatin in 2018, but stopped it on his own.  He has made some dietary improvements recently.    MEDICATIONS:  Current Outpatient Medications   Medication     aspirin 81 MG tablet     atorvastatin (LIPITOR) 20 MG tablet     gabapentin (NEURONTIN) 100 MG capsule     No current facility-administered medications for this visit.        ALLERGIES:  Allergies   Allergen Reactions     No Known Drug Allergies      Seasonal Allergies        REVIEW OF SYSTEMS:  Constitutional:  No weight loss, fever, chills, weakness or fatigue.  HEENT:  Eyes:  No visual loss, blurred vision, double vision or yellow sclerae. No hearing loss, sneezing, congestion, runny nose or sore throat.  Skin:  No rash or itching.  Cardiovascular: per HPI  Respiratory: per HPI  GI:  No anorexia, nausea, vomiting or diarrhea. No abdominal pain or blood.  :  No dysurea, hematuria  Neurologic:  No headache, dizziness, syncope, paralysis, ataxia, numbness or tingling in the extremities. No change in bowel or bladder control.  Musculoskeletal:  No muscle, back pain, joint pain or stiffness.  Hematologic:  No anemia, bleeding or bruising.  Lymphatics:  No enlarged nodes. No history of  splenectomy.  Psychiatric:  No history of depression or anxiety.  Endocrine:  No reports of sweating, cold or heat intolerance. No polyuria or polydipsia.  Allergies:  No history of asthma, hives, eczema or rhinitis.    PHYSICAL EXAM:  /72 (BP Location: Right arm, Patient Position: Chair, Cuff Size: Adult Regular)   Pulse 64   Wt 80 kg (176 lb 6.4 oz)   SpO2 99%   BMI 24.60 kg/m    Constitutional: awake, alert, no distress  Eyes: PERRL, sclera nonicteric  ENT: trachea midline  Respiratory: Lungs clear  Cardiovascular: Bradycardic, regular rhythm, no murmurs  GI: nondistended, nontender, bowel sounds present  Lymph/Hematologic: no lymphadenopathy  Skin: dry, no rash  Musculoskeletal: good muscle tone, strength 5/5 in upper and lower extremities  Neurologic: no focal deficits  Neuropsychiatric: appropriate affact    DATA:  Lab:   Recent Labs   Lab Test 09/04/19  0815 02/13/18  0814   CHOL 217* 141   HDL 66 68   * 53   TRIG 162* 100     ASSESSMENT:  54-year-old male seen for follow-up of elevated calcium score.  He is extremely physically fit and has no concerning symptoms.  The main issue for him will be optimizing his risk factors.  His lipids are mildly elevated now that he is off a statin.  They did improve from his baseline with improvements in his diet.  We talked about the benefits of statin therapy in the context of an elevated calcium score.  Ideally LDL should be less than 100, preferably less than 70.  He is agreeable to going back on a low-dose of atorvastatin.    We also talked about the controversies of aspirin for primary prevention.  Since his calcium score is over the 90th percentile, it may be reasonable to error on the side of taking a low-dose aspirin.    RECOMMENDATIONS:  1.  Elevated coronary calcium score  -Start atorvastatin 10 mg daily, goal LDL 70 or less, check lipids in 1 to 2 months  -Restart aspirin 81 mg daily  -Continue routine exercise    Follow-up in 1 year.  Consider  repeat stress testing in few years.    Hitesh De Santiago MD  Cardiology - [facility]  Pager:  [phone]  Text Page  September 4, 2019

## 2019-09-04 NOTE — LETTER
9/4/2019    Yvonne Franco, APRN CNP  [address]  [facility] [address]    RE: Miller Burkett       Dear Colleague,    I had the pleasure of seeing Miller Burkett in the [facility].    CARDIOLOGY VISIT    REASON FOR VISIT: coronary calcification    SUBJECTIVE:  54-year-old male with no cardiac history is seen for cardiovascular screening.  He is a lifelong nonsmoker, he has no high blood pressure or dyslipidemia.      He has a strong family history of coronary artery disease.  His father had a CABG at age 53.  He has 2 brothers with elevated calcium scores, 1 underwent a stent.      Coronary calcium score December 2017 of 318, 96 percentile. Treadmill stress test December 2017 showed 14 minutes, no ischemia on EKG, no symptoms.     He is extremely physically active exercising 8 hours or more per week.  He will do triathlons and has done a 20 mile run recently.  He denies any chest pain or shortness of breath with his symptoms.  He was on atorvastatin in 2018, but stopped it on his own.  He has made some dietary improvements recently.    MEDICATIONS:  Current Outpatient Medications   Medication     aspirin 81 MG tablet     atorvastatin (LIPITOR) 20 MG tablet     gabapentin (NEURONTIN) 100 MG capsule     No current facility-administered medications for this visit.        ALLERGIES:  Allergies   Allergen Reactions     No Known Drug Allergies      Seasonal Allergies        REVIEW OF SYSTEMS:  Constitutional:  No weight loss, fever, chills, weakness or fatigue.  HEENT:  Eyes:  No visual loss, blurred vision, double vision or yellow sclerae. No hearing loss, sneezing, congestion, runny nose or sore throat.  Skin:  No rash or itching.  Cardiovascular: per HPI  Respiratory: per HPI  GI:  No anorexia, nausea, vomiting or diarrhea. No abdominal pain or blood.  :  No dysurea, hematuria  Neurologic:  No headache, dizziness, syncope, paralysis, ataxia, numbness or tingling in the  extremities. No change in bowel or bladder control.  Musculoskeletal:  No muscle, back pain, joint pain or stiffness.  Hematologic:  No anemia, bleeding or bruising.  Lymphatics:  No enlarged nodes. No history of splenectomy.  Psychiatric:  No history of depression or anxiety.  Endocrine:  No reports of sweating, cold or heat intolerance. No polyuria or polydipsia.  Allergies:  No history of asthma, hives, eczema or rhinitis.    PHYSICAL EXAM:  /72 (BP Location: Right arm, Patient Position: Chair, Cuff Size: Adult Regular)   Pulse 64   Wt 80 kg (176 lb 6.4 oz)   SpO2 99%   BMI 24.60 kg/m     Constitutional: awake, alert, no distress  Eyes: PERRL, sclera nonicteric  ENT: trachea midline  Respiratory: Lungs clear  Cardiovascular: Bradycardic, regular rhythm, no murmurs  GI: nondistended, nontender, bowel sounds present  Lymph/Hematologic: no lymphadenopathy  Skin: dry, no rash  Musculoskeletal: good muscle tone, strength 5/5 in upper and lower extremities  Neurologic: no focal deficits  Neuropsychiatric: appropriate affact    DATA:  Lab:   Recent Labs   Lab Test 09/04/19  0815 02/13/18  0814   CHOL 217* 141   HDL 66 68   * 53   TRIG 162* 100     ASSESSMENT:  54-year-old male seen for follow-up of elevated calcium score.  He is extremely physically fit and has no concerning symptoms.  The main issue for him will be optimizing his risk factors.  His lipids are mildly elevated now that he is off a statin.  They did improve from his baseline with improvements in his diet.  We talked about the benefits of statin therapy in the context of an elevated calcium score.  Ideally LDL should be less than 100, preferably less than 70.  He is agreeable to going back on a low-dose of atorvastatin.    We also talked about the controversies of aspirin for primary prevention.  Since his calcium score is over the 90th percentile, it may be reasonable to error on the side of taking a low-dose  aspirin.    RECOMMENDATIONS:  1.  Elevated coronary calcium score  -Start atorvastatin 10 mg daily, goal LDL 70 or less, check lipids in 1 to 2 months  -Restart aspirin 81 mg daily  -Continue routine exercise    Follow-up in 1 year.  Consider repeat stress testing in few years.    Hitesh De Santiago MD  Cardiology - [facility]  Pager:  [phone]  Text Page  September 4, 2019      Thank you for allowing me to participate in the care of your patient.    Sincerely,     Hitesh De Santiago MD     [facility]

## 2020-05-11 ENCOUNTER — NURSE TRIAGE (OUTPATIENT)
Dept: NURSING | Facility: CLINIC | Age: 55
End: 2020-05-11

## 2020-05-11 ENCOUNTER — VIRTUAL VISIT (OUTPATIENT)
Dept: FAMILY MEDICINE | Facility: OTHER | Age: 55
End: 2020-05-11

## 2020-05-11 NOTE — TELEPHONE ENCOUNTER
Triage call:      Recent travel to [place] last week    Father passed away- went to  this weekend    Mother was at - live in senior living facility with 8 confirmed cases of COVID 19   limited people but there were people traveling from out of [facility]- [place] and [place]    Yesterday - he and his wife noted a Slight cough- no fever - no sob at this time    Reports mild cardiac disease- increased risk individual     Triaged to pursue an oncare visit- reviewed below information with patient and he will do oncare now. Declines additional questions.      Corry Balderas RN BSN 2020 10:28 AM    COVID 19 Nurse Triage Plan/Patient Instructions    Please be aware that novel coronavirus (COVID-19) may be circulating in the community. If you develop symptoms such as fever, cough, or SOB or if you have concerns about the presence of another infection including coronavirus (COVID-19), please contact your health care provider or visit [email or web address].     Disposition/Instructions    Patient to have an OnCare Visit with a provider (Preferred option). Follow System Ambulatory Workflow for COVID 19.     To do this follow these instructions:    1. Go to the website [email or web address]  2. Create an account (you will need your insurance information)  3. Start a new visit  4. Choose your diagnosis (e.g. COVID19)  5. Fill out the information about your symptoms  6. A provider will reach out to you by text, phone call or video visit based on your request    Call Back If: Your symptoms worsen before you are able to complete your OnCare Visit with a provider.    Thank you for limiting contact with others, wearing a simple mask to cover your cough, practice good hand hygiene habits and accessing our virtual services where possible to limit the spread of this virus.    For more information about COVID19 and options for caring for yourself at home, please visit the CDC website at  https://www.cdc.gov/coronavirus/2019-ncov/about/steps-when-sick.html  For more options for care at [facility], please visit our website at [email or web address]    For more information, please use the [place] Department of Health COVID-19 Website: [email or web address]  [place] Department of Health ([facility]) COVID-19 Hotlines (Interpreters available):      Health questions: Phone Number: [phone] or [phone] and Hours: 7 a.m. to 7 p.m.    Schools and  questions: Phone Number: [phone] or [phone] and Hours 7 a.m. to 7 p.m.    Reason for Disposition    HIGH RISK patient (e.g., age > 64 years, diabetes, heart or lung disease, weak immune system)    Additional Information    Negative: SEVERE difficulty breathing (e.g., struggling for each breath, speaks in single words)    Negative: Difficult to awaken or acting confused (e.g., disoriented, slurred speech)    Negative: Bluish (or gray) lips or face now    Negative: Shock suspected (e.g., cold/pale/clammy skin, too weak to stand, low BP, rapid pulse)    Negative: Sounds like a life-threatening emergency to the triager    Negative: [1] COVID-19 suspected (e.g., cough, fever, shortness of breath) AND [2] mild symptoms AND [3] public health department recommends testing    Negative: [1] COVID-19 exposure AND [2] no symptoms    Negative: COVID-19 and Breastfeeding, questions about    Negative: SEVERE or constant chest pain (Exception: mild central chest pain, present only when coughing)    Negative: MODERATE difficulty breathing (e.g., speaks in phrases, SOB even at rest, pulse 100-120)    Negative: Patient sounds very sick or weak to the triager    Negative: MILD difficulty breathing (e.g., minimal/no SOB at rest, SOB with walking, pulse <100)    Negative: Chest pain    Negative: Fever > 103 F (39.4 C)    Negative: [1] Fever > 101 F (38.3 C) AND [2] age > 60     Negative: [1] Fever > 100.0 F (37.8 C) AND [2] bedridden (e.g., nursing home patient, CVA, chronic illness, recovering from surgery)    Protocols used: CORONAVIRUS (COVID-19) DIAGNOSED OR [id number] 4.22.20

## 2020-05-11 NOTE — PROGRESS NOTES
"Date: 2020 11:20:58  Clinician: Sangeetha Ram  Clinician NPI: [id number]  Patient: Miller Burkett  Patient : 1965  Patient Address: [address] [place] [address], [place] [address]  Patient Phone: [phone]  Visit Protocol: URI  Patient Summary:  Miller is a 54 year old ( : 1965 ) male who initiated a Visit for COVID-19 (Coronavirus) evaluation and screening. When asked the question \"Please sign me up to receive news, health information and promotions from [employer].\", Miller responded \"No\".    Miller states his symptoms started 1-2 days ago.   His symptoms consist of a cough, malaise, and enlarged lymph nodes.   Symptom details   Cough: Miller coughs a few times an hour and his cough is not more bothersome at night. Phlegm does not come into his throat when he coughs. He does not believe his cough is caused by post-nasal drip.    Miller denies having fever, myalgias, rhinitis, facial pain or pressure, sore throat, nasal congestion, vomiting, nausea, teeth pain, ageusia, anosmia, diarrhea, ear pain, headache, wheezing, and chills. He also denies taking antibiotic medication for the symptoms and having recent facial or sinus surgery in the past 60 days. He is not experiencing dyspnea.   Precipitating events  He has not recently been exposed to someone with influenza. Miller has not been in close contact with any high risk individuals.   Pertinent COVID-19 (Coronavirus) information  Miller does not work or volunteer as healthcare worker or a  and does not work or volunteer in a healthcare facility.   He does not live with a healthcare worker.   Miller has not had a close contact with a laboratory-confirmed COVID-19 patient within 14 days of symptom onset. He also has not had a close contact with a suspected COVID-19 patient within 14 days of symptom onset.   Pertinent medical history  Miller does not need a return to work/school note.   Weight: 177 lbs   Miller does not smoke or use smokeless tobacco. "   Additional information as reported by the patient (free text): I recently traveled to the [place] area for a . Before he , my father had been in a hospital in [place], and my sister and brother in law had to pick him up there. (It was not a Covid-designated hospital.) Although we social distanced and wore masks, two people had flown from [place] and one from [place]. Also, my mother was in the group and lives in an assisted living facility where there are confirmed Covid cases. We returned to [place] on Friday and  I began coughing some.   Weight: 177 lbs    MEDICATIONS: Children's Aspirin oral, atorvastatin oral, ALLERGIES: NKDA  Clinician Response:  Dear Miller,   Dear Miller  Your symptoms show that you may have coronavirus (COVID-19). This illness can cause fever, cough and trouble breathing. Many people get a mild case and get better on their own. Some people can get very sick.  Will I be tested for COVID-19?  Because we have limited testing supplies we are not testing everyone if they are low risk. We are testing if:   You are very ill. For example, you're on chemotherapy, dialysis or home hospice care. (Contact your specialty clinic or program.)   You live in a nursing home or other long-term care facility. (Talk to your nurse manager or medical director.)   You're a health care worker. ([facility] employees Contact our employee health office for testing.)   We are performing limited curbside testing for healthcare/first responders and people with medical problems that put them at increased risk. It does not appear by the OnCare information you submitted that you meet any of these criteria. If there are medical problems that we did not know about, please repeat an OnCare visit and let us know what medical conditions you have.   While you don't qualify for testing at [facility], you may find testing at another site. To look for a testing location, go to  https://[place].gov/covid19/for-[place]/if-sick/testing-locations/index.jsp.   How can I protect others?  Without a test, we can't know for sure that you have COVID-19. For safety, it's very important to follow these rules.  First, stay home and away from others (self-isolate) until:   You've had no fever---and no medicine that reduces fever---for 3 full days (72 hours). And...    Your other symptoms have gotten better. For example, your cough or breathing has improved. And...   At least 10 days have passed since your symptoms started.   During this time:   Don't go to work, school or anywhere else.    Stay away from others in your home. No hugging, kissing or shaking hands.   Don't let anyone visit.   Cover your mouth and nose with a mask, tissue or wash cloth to avoid spreading germs.   Wash your hands and face often. Use soap and water.   How can I take care of myself?  1.Take Tylenol (acetaminophen) for fever or pain. If you have liver or kidney problems, ask your family doctor if it's okay to take Tylenol.   Adults can take either:    650 mg (two 325 mg pills) every 4 to 6 hours, or...   1,000 mg (two 500 mg pills) every 8 hours as needed.    Note: Don't take more than 3,000 mg in one day.  For children, check the Tylenol bottle for the right dose. The dose is based on the child's age or weight.   2.If you have other health problems (like cancer, heart failure, an organ transplant or severe kidney disease): Call your specialty clinic if you don't feel better in the next 2 days.  3.Know when to call 911: If your breathing is so bad that it keeps you from doing normal activities, call 911 or go to the emergency room. Tell them that you've been staying home and may have COVID-19.  4.Sign up for [employer]. We know it's scary to hear that you might have COVID-19. We want to track your symptoms to make sure you're okay over the next 2 weeks. Please look for an email from [employer]---this is a free, online  program that we'll use to keep in touch. To sign up, follow the link in the email. Learn more at [email or web address].  Where can I get more information?  To learn more about COVID-19 and how to care for yourself at home, please visit the CDC website at https://www.cdc.gov/coronavirus/2019-ncov/about/steps-when-sick.html.  For more options for care at [facility], please visit our website at [email or web address]   If you are interested in becoming part of a N clinic trial related to COVID19 please go to [email or web address]-[email or web address] for information, if you qualify.     Diagnosis: Cough  Diagnosis ICD: R05

## 2020-10-06 DIAGNOSIS — E78.5 DYSLIPIDEMIA: ICD-10-CM

## 2020-10-07 RX ORDER — ATORVASTATIN CALCIUM 10 MG/1
10 TABLET, FILM COATED ORAL DAILY
Qty: 90 TABLET | Refills: 3 | Status: SHIPPED | OUTPATIENT
Start: 2020-10-07 | End: 2021-12-17

## 2020-10-07 NOTE — TELEPHONE ENCOUNTER
Routing refill request to provider for review/approval because:  Labs not current:  Lipids  Prescribing is cardiology not internal medicine, routed to cardiology, Dr Smith, pharmacy sent to Yvonne Franco ?? Does not appear to be a provider?  Kennedi Price RN, BSN  Message handled by CLINIC NURSE.

## 2021-12-17 DIAGNOSIS — E78.5 DYSLIPIDEMIA: ICD-10-CM

## 2021-12-17 RX ORDER — ATORVASTATIN CALCIUM 10 MG/1
10 TABLET, FILM COATED ORAL DAILY
Qty: 90 TABLET | Refills: 0 | Status: SHIPPED | OUTPATIENT
Start: 2021-12-17 | End: 2022-01-19

## 2022-01-14 NOTE — PROGRESS NOTES
CARDIOLOGY VISIT    REASON FOR VISIT: Coronary calcification, risk factor management    SUBJECTIVE:  56-year-old male with no cardiac history is seen for cardiovascular screening.  He is a lifelong nonsmoker, he has no high blood pressure or dyslipidemia.       He has a strong family history of coronary artery disease.  His father had a CABG at age 53.  He has 2 brothers with elevated calcium scores, 1 underwent a stent.       Coronary calcium score December 2017 of 318, 96 percentile. Treadmill stress test December 2017 showed 14 minutes, no ischemia on EKG, no symptoms.     He has been doing well recently.  He has been exercising less because of some injuries, but still does a lot of running or biking.  He denies any exertional chest pain or dyspnea.  Blood pressure is checked outside of clinic, but has never been high.  He is tolerating his atorvastatin.    MEDICATIONS:  Current Outpatient Medications   Medication     aspirin 81 MG tablet     atorvastatin (LIPITOR) 10 MG tablet     gabapentin (NEURONTIN) 100 MG capsule     No current facility-administered medications for this visit.       ALLERGIES:  Allergies   Allergen Reactions     No Known Drug Allergies      Seasonal Allergies        REVIEW OF SYSTEMS:  Constitutional:  No weight loss, fever, chills, weakness or fatigue.  HEENT:  Eyes:  No visual loss, blurred vision, double vision or yellow sclerae. No hearing loss, sneezing, congestion, runny nose or sore throat.  Skin:  No rash or itching.  Cardiovascular: per HPI  Respiratory: per HPI  GI:  No anorexia, nausea, vomiting or diarrhea. No abdominal pain or blood.  :  No dysurea, hematuria  Neurologic:  No headache, dizziness, syncope, paralysis, ataxia, numbness or tingling in the extremities. No change in bowel or bladder control.  Musculoskeletal:  No muscle, back pain, joint pain or stiffness.  Hematologic:  No anemia, bleeding or bruising.  Lymphatics:  No enlarged nodes. No history of  splenectomy.  Psychiatric:  No history of depression or anxiety.  Endocrine:  No reports of sweating, cold or heat intolerance. No polyuria or polydipsia.  Allergies:  No history of asthma, hives, eczema or rhinitis.    PHYSICAL EXAM:  /68 (BP Location: Right arm, Patient Position: Chair, Cuff Size: Adult Large)   Pulse 60   Wt 81.1 kg (178 lb 12.8 oz)   SpO2 98%   BMI 24.94 kg/m      Constitutional: awake, alert, no distress  Eyes: PERRL, sclera nonicteric  ENT: trachea midline  Respiratory: Lungs clear  Cardiovascular: Regular rate and rhythm, no murmurs  GI: nondistended, nontender, bowel sounds present  Lymph/Hematologic: no lymphadenopathy  Skin: dry, no rash  Musculoskeletal: good muscle tone, strength 5/5 in upper and lower extremities  Neurologic: no focal deficits  Neuropsychiatric: appropriate affact    DATA:  Lab:   Recent Labs   Lab Test 01/18/22  0733 09/04/19  0815   CHOL 158 217*   HDL 68 66   LDL 75 119*   TRIG 76 162*     ASSESSMENT:  56-year-old male seen for follow-up of coronary calcification.  He is doing well with no concerning symptoms.  Lipids are at goal on low-dose atorvastatin.  We talked about aspirin, it is somewhat controversial as to whether or not he should be on it.  There is probably a neutral net benefit.  He is fine staying off it for now.    RECOMMENDATIONS:  1.  Coronary calcification  -No cardiac symptoms, no further testing at this time  -Continue atorvastatin, lipids at goal    Follow-up in 2 years with BONITA, recheck lipids.    Hitesh De Santiago MD  Cardiology - [facility]  Pager:  [phone]  Text Page  January 19, 2022

## 2022-01-18 ENCOUNTER — LAB (OUTPATIENT)
Dept: LAB | Facility: CLINIC | Age: 57
End: 2022-01-18
Payer: COMMERCIAL

## 2022-01-18 DIAGNOSIS — E78.5 DYSLIPIDEMIA: ICD-10-CM

## 2022-01-18 LAB
CHOLEST SERPL-MCNC: 158 MG/DL
FASTING STATUS PATIENT QL REPORTED: YES
HDLC SERPL-MCNC: 68 MG/DL
LDLC SERPL CALC-MCNC: 75 MG/DL
NONHDLC SERPL-MCNC: 90 MG/DL
TRIGL SERPL-MCNC: 76 MG/DL

## 2022-01-18 PROCEDURE — 80061 LIPID PANEL: CPT

## 2022-01-18 PROCEDURE — 36415 COLL VENOUS BLD VENIPUNCTURE: CPT

## 2022-01-19 ENCOUNTER — OFFICE VISIT (OUTPATIENT)
Dept: CARDIOLOGY | Facility: CLINIC | Age: 57
End: 2022-01-19
Payer: COMMERCIAL

## 2022-01-19 VITALS
HEART RATE: 60 BPM | WEIGHT: 178.8 LBS | SYSTOLIC BLOOD PRESSURE: 124 MMHG | BODY MASS INDEX: 24.94 KG/M2 | DIASTOLIC BLOOD PRESSURE: 68 MMHG | OXYGEN SATURATION: 98 %

## 2022-01-19 DIAGNOSIS — I25.10 CORONARY ARTERY CALCIFICATION: ICD-10-CM

## 2022-01-19 DIAGNOSIS — E78.5 DYSLIPIDEMIA: Primary | ICD-10-CM

## 2022-01-19 PROCEDURE — 99214 OFFICE O/P EST MOD 30 MIN: CPT | Performed by: INTERNAL MEDICINE

## 2022-01-19 RX ORDER — ATORVASTATIN CALCIUM 10 MG/1
10 TABLET, FILM COATED ORAL DAILY
Qty: 90 TABLET | Refills: 3 | Status: SHIPPED | OUTPATIENT
Start: 2022-01-19 | End: 2023-02-15

## 2022-01-19 NOTE — LETTER
1/19/2022    Yvonne Franco, APRN CNP  [address]  [facility] [address]    RE: Miller Burkett       Dear Colleague,     I had the pleasure of seeing Miller Burkett in the [facility].  CARDIOLOGY VISIT    REASON FOR VISIT: Coronary calcification, risk factor management    SUBJECTIVE:  56-year-old male with no cardiac history is seen for cardiovascular screening.  He is a lifelong nonsmoker, he has no high blood pressure or dyslipidemia.       He has a strong family history of coronary artery disease.  His father had a CABG at age 53.  He has 2 brothers with elevated calcium scores, 1 underwent a stent.       Coronary calcium score December 2017 of 318, 96 percentile. Treadmill stress test December 2017 showed 14 minutes, no ischemia on EKG, no symptoms.     He has been doing well recently.  He has been exercising less because of some injuries, but still does a lot of running or biking.  He denies any exertional chest pain or dyspnea.  Blood pressure is checked outside of clinic, but has never been high.  He is tolerating his atorvastatin.    MEDICATIONS:  Current Outpatient Medications   Medication     aspirin 81 MG tablet     atorvastatin (LIPITOR) 10 MG tablet     gabapentin (NEURONTIN) 100 MG capsule     No current facility-administered medications for this visit.       ALLERGIES:  Allergies   Allergen Reactions     No Known Drug Allergies      Seasonal Allergies        REVIEW OF SYSTEMS:  Constitutional:  No weight loss, fever, chills, weakness or fatigue.  HEENT:  Eyes:  No visual loss, blurred vision, double vision or yellow sclerae. No hearing loss, sneezing, congestion, runny nose or sore throat.  Skin:  No rash or itching.  Cardiovascular: per HPI  Respiratory: per HPI  GI:  No anorexia, nausea, vomiting or diarrhea. No abdominal pain or blood.  :  No dysurea, hematuria  Neurologic:  No headache, dizziness, syncope, paralysis, ataxia, numbness or tingling in the  extremities. No change in bowel or bladder control.  Musculoskeletal:  No muscle, back pain, joint pain or stiffness.  Hematologic:  No anemia, bleeding or bruising.  Lymphatics:  No enlarged nodes. No history of splenectomy.  Psychiatric:  No history of depression or anxiety.  Endocrine:  No reports of sweating, cold or heat intolerance. No polyuria or polydipsia.  Allergies:  No history of asthma, hives, eczema or rhinitis.    PHYSICAL EXAM:  /68 (BP Location: Right arm, Patient Position: Chair, Cuff Size: Adult Large)   Pulse 60   Wt 81.1 kg (178 lb 12.8 oz)   SpO2 98%   BMI 24.94 kg/m      Constitutional: awake, alert, no distress  Eyes: PERRL, sclera nonicteric  ENT: trachea midline  Respiratory: Lungs clear  Cardiovascular: Regular rate and rhythm, no murmurs  GI: nondistended, nontender, bowel sounds present  Lymph/Hematologic: no lymphadenopathy  Skin: dry, no rash  Musculoskeletal: good muscle tone, strength 5/5 in upper and lower extremities  Neurologic: no focal deficits  Neuropsychiatric: appropriate affact    DATA:  Lab:   Recent Labs   Lab Test 01/18/22  0733 09/04/19  0815   CHOL 158 217*   HDL 68 66   LDL 75 119*   TRIG 76 162*     ASSESSMENT:  56-year-old male seen for follow-up of coronary calcification.  He is doing well with no concerning symptoms.  Lipids are at goal on low-dose atorvastatin.  We talked about aspirin, it is somewhat controversial as to whether or not he should be on it.  There is probably a neutral net benefit.  He is fine staying off it for now.    RECOMMENDATIONS:  1.  Coronary calcification  -No cardiac symptoms, no further testing at this time  -Continue atorvastatin, lipids at goal    Follow-up in 2 years with BONITA, recheck lipids.    Hitesh De Santiago MD  Cardiology - [facility]  Pager:  [phone]  Text Page  January 19, 2022

## 2022-03-05 ENCOUNTER — HEALTH MAINTENANCE LETTER (OUTPATIENT)
Age: 57
End: 2022-03-05

## 2022-11-20 ENCOUNTER — HEALTH MAINTENANCE LETTER (OUTPATIENT)
Age: 57
End: 2022-11-20

## 2023-02-14 DIAGNOSIS — E78.5 DYSLIPIDEMIA: ICD-10-CM

## 2023-02-15 RX ORDER — ATORVASTATIN CALCIUM 10 MG/1
10 TABLET, FILM COATED ORAL DAILY
Qty: 90 TABLET | Refills: 0 | Status: SHIPPED | OUTPATIENT
Start: 2023-02-15 | End: 2023-05-17

## 2023-02-15 NOTE — TELEPHONE ENCOUNTER
Routing refill request to provider for review/approval because:  Patient needs to be seen because it has been more than 1 year since last office visit, due for an appt, and ldl - has been filled by Dr. De Santiago, will forward the refill there.

## 2023-02-15 NOTE — TELEPHONE ENCOUNTER
[facility] Cardiology Refill Guideline reviewed.  Medication meets criteria for refill.    Last refill- No further refills until seen by cardiology--call [phone] to schedule.    Emely Lyn RN

## 2023-04-15 ENCOUNTER — HEALTH MAINTENANCE LETTER (OUTPATIENT)
Age: 58
End: 2023-04-15

## 2023-05-15 DIAGNOSIS — E78.5 DYSLIPIDEMIA: ICD-10-CM

## 2023-05-17 RX ORDER — ATORVASTATIN CALCIUM 10 MG/1
10 TABLET, FILM COATED ORAL DAILY
Qty: 90 TABLET | Refills: 0 | Status: SHIPPED | OUTPATIENT
Start: 2023-05-17 | End: 2023-08-29

## 2023-05-17 NOTE — TELEPHONE ENCOUNTER
"Requested Prescriptions   Pending Prescriptions Disp Refills     atorvastatin (LIPITOR) 10 MG tablet 90 tablet 0     Sig: Take 1 tablet (10 mg) by mouth daily No further refills until seen by cardiology--call [phone] to schedule       Statins Protocol Failed - 5/17/2023  1:53 PM        Failed - LDL on file in past 12 months     Recent Labs   Lab Test 01/18/22  0733   LDL 75             Failed - Recent (12 mo) or future (30 days) visit within the authorizing provider's specialty     Patient has had an office visit with the authorizing provider or a provider within the authorizing providers department within the previous 12 mos or has a future within next 30 days. See \"Patient Info\" tab in inbasket, or \"Choose Columns\" in Meds & Orders section of the refill encounter.              Passed - No abnormal creatine kinase in past 12 months     No lab results found.             Passed - Medication is active on med list        Passed - Patient is age 18 or older           Prescribing provider in cardiology, never seen at [place]. Routing to provider and clinic prescribed from.    Michael Mishra RN   [facility]    "

## 2023-08-28 ENCOUNTER — TELEPHONE (OUTPATIENT)
Dept: CARDIOLOGY | Facility: CLINIC | Age: 58
End: 2023-08-28
Payer: COMMERCIAL

## 2023-08-28 DIAGNOSIS — E78.5 DYSLIPIDEMIA: ICD-10-CM

## 2023-08-28 NOTE — TELEPHONE ENCOUNTER
[facility] Call Center    Phone Message    May a detailed message be left on voicemail: yes     Reason for Call: Medication Refill Request    Has the patient contacted the pharmacy for the refill? Yes   Name of medication being requested: atorvastatin (LIPITOR) 10 MG tablet [   Provider who prescribed the medication: Dr. De Santiago, Hitesh Haynes MD  Pharmacy:    [id number] - [place], [place] - [address]     Date medication is needed: 8/29/23     Action Taken: Other: cardiology    Travel Screening: Not Applicable    Thank you!  [facility]

## 2023-08-29 RX ORDER — ATORVASTATIN CALCIUM 10 MG/1
10 TABLET, FILM COATED ORAL DAILY
Qty: 90 TABLET | Refills: 1 | Status: SHIPPED | OUTPATIENT
Start: 2023-08-29 | End: 2024-02-09

## 2024-02-08 DIAGNOSIS — E78.5 DYSLIPIDEMIA: Primary | ICD-10-CM

## 2024-02-09 ENCOUNTER — LAB (OUTPATIENT)
Dept: LAB | Facility: CLINIC | Age: 59
End: 2024-02-09
Payer: COMMERCIAL

## 2024-02-09 ENCOUNTER — OFFICE VISIT (OUTPATIENT)
Dept: CARDIOLOGY | Facility: CLINIC | Age: 59
End: 2024-02-09
Payer: COMMERCIAL

## 2024-02-09 VITALS
WEIGHT: 185.1 LBS | BODY MASS INDEX: 25.91 KG/M2 | SYSTOLIC BLOOD PRESSURE: 122 MMHG | DIASTOLIC BLOOD PRESSURE: 72 MMHG | HEART RATE: 60 BPM | HEIGHT: 71 IN | OXYGEN SATURATION: 96 %

## 2024-02-09 DIAGNOSIS — E78.5 DYSLIPIDEMIA: Primary | ICD-10-CM

## 2024-02-09 DIAGNOSIS — E78.5 DYSLIPIDEMIA: ICD-10-CM

## 2024-02-09 DIAGNOSIS — Z82.49 FAMILY HISTORY OF CARDIOVASCULAR DISEASE: ICD-10-CM

## 2024-02-09 LAB
ALT SERPL W P-5'-P-CCNC: 30 U/L (ref 0–70)
CHOLEST SERPL-MCNC: 188 MG/DL
FASTING STATUS PATIENT QL REPORTED: YES
HDLC SERPL-MCNC: 72 MG/DL
LDLC SERPL CALC-MCNC: 91 MG/DL
NONHDLC SERPL-MCNC: 116 MG/DL
TRIGL SERPL-MCNC: 123 MG/DL

## 2024-02-09 PROCEDURE — 80061 LIPID PANEL: CPT | Performed by: NURSE PRACTITIONER

## 2024-02-09 PROCEDURE — 36415 COLL VENOUS BLD VENIPUNCTURE: CPT | Performed by: NURSE PRACTITIONER

## 2024-02-09 PROCEDURE — 84460 ALANINE AMINO (ALT) (SGPT): CPT | Performed by: NURSE PRACTITIONER

## 2024-02-09 PROCEDURE — 99213 OFFICE O/P EST LOW 20 MIN: CPT | Performed by: NURSE PRACTITIONER

## 2024-02-09 RX ORDER — GABAPENTIN 300 MG/1
300 CAPSULE ORAL 3 TIMES DAILY
COMMUNITY
Start: 2024-02-07

## 2024-02-09 RX ORDER — ATORVASTATIN CALCIUM 10 MG/1
10 TABLET, FILM COATED ORAL DAILY
Qty: 90 TABLET | Refills: 3 | Status: SHIPPED | OUTPATIENT
Start: 2024-02-09 | End: 2024-02-12

## 2024-02-09 NOTE — PATIENT INSTRUCTIONS
Today's Recommendations    We will check your cholesterol today since you are fasting  Continue all medications without changes.  Please follow up with Dr. De Santiago in 2 years unless you need to come in sooner.    Please send a [facility] message or call [phone] to the RN team with questions or concerns.     Scheduling number [phone]  SUELLEN Mast, CNP

## 2024-02-09 NOTE — LETTER
2/9/2024    Yvonne Franco, APRN CNP  [address]  [facility] [address]    RE: Miller Burkett       Dear Colleague,     I had the pleasure of seeing Miller Burkett in the [facility].    Cardiology Clinic Progress Note  Miller Burkett MRN# [id number]   YOB: 1965 Age: 58 year old   Primary Cardiologist: Dr. De Santiago Reason for visit: Annual follow up            Assessment and Plan:       1.  Coronary artery calcification       FH of CAD  -12/2017 coronary calcium score 318: , circumflex 44, RCA 98  -12/2017 treadmill stress test: normal with no evidence of stress induced ischemia, patient exercised 14 minutes  -No current anginal symptoms     2.  Hyperlipidemia, goal LDL <70  -1/2022 LDL 75      Plan:  FLP/ALT today as patient is fasting   Continue atorvastatin 10mg daily     Follow up plan: Follow up with Dr. De Santiago in 2 years or sooner if needed        History of Presenting Illness:    Miller Burkett is a very pleasant 58 year old male with a history of elevated coronary calcium score and strong family history of coronary artery disease.  Patient is a non-smoker, normotensive.  His family history of coronary disease includes a father with coronary artery bypass grafting at age 53, 2 brothers with elevated coronary calcium scores, 1 of which underwent a stenting procedure.    Patient had a coronary calcium score done in December 2017 which was 318 placing him in the 96 percentile compared to age and gender matched control groups.  A treadmill stress test done later that month showed no ischemia on the EKG and patient did not have any symptoms.    Patient was seen by Dr. De Santiago in January 2022 at which point he was doing well from a cardiovascular perspective.  They discussed aspirin therapy, however recommendation was not to continue it at that time as felt to be a net neutral benefit.    Patient is here today for a follow up. Patient reports feeling  good. Swims 30 minutes a few times per week, runs 20-40 minutes. Exercising a total of about 3-4 hours per week. During and after exercise he has no cardiac symptoms.     Patient denies chest pain or chest tightness. Denies dizziness, lightheadedness or other presyncopal symptoms. Denies tachycardia or palpitations.  Denies shortness of breath at rest or with exertion.     Blood pressure 122/72 and HR 60 in clinic today.    He has been compliant with his atorvastatin without any myalgias.     No tobacco use. Drinks wine 3 times per week, 2 glasses. Follows a whole food diet, lean proteins, primarily cooks at home.         Recent Hospitalizations   None in [id number]          Social History    , has 3 children, ages 30, 28, 24   Social History     Socioeconomic History    Marital status:      Spouse name: Not on file    Number of children: Not on file    Years of education: Not on file    Highest education level: Not on file   Occupational History    Occupation: copyright      Employer: [employer]   Tobacco Use    Smoking status: Never    Smokeless tobacco: Never   Substance and Sexual Activity    Alcohol use: Yes     Comment: 2-3 drinks per wk    Drug use: No    Sexual activity: Yes     Partners: Female     Birth control/protection: Male Surgical   Other Topics Concern    Parent/sibling w/ CABG, MI or angioplasty before 65F 55M? Not Asked   Social History Narrative    Balanced Diet - Yes    Osteoporosis Preventative measures-  Dairy servings per day:  4    Regular Exercise -  Yes Describe swim, bike, and run    Dental Exam up - YES - Date: 1/2007    Eye Exam - NO    Self Testicular Exam -  Not every month q 3 months    Do you have any concerns about STD's -  No    Abuse: Current or Past (Physical, Sexual or Emotional)- No    Do you feel safe in your environment - Yes    Guns stored in the home - No    Sunscreen used - Yes    Seatbelts used - Yes    Lipids - NO    Glucose -  NO     "Colon Cancer Screening - No    Hemoccults - YES    PSA - UNKNOWN    Digital Rectal Exam - UNKNOWN    Immunizations reviewed and up to date - unknown    WILLIAM Carrillo MA     Social Determinants of Health     Financial Resource Strain: Not on file   Food Insecurity: Not on file   Transportation Needs: Not on file   Physical Activity: Not on file   Stress: Not on file   Social Connections: Not on file   Interpersonal Safety: Not on file   Housing Stability: Not on file            Review of Systems:   Skin:  not assessed     Eyes:  not assessed    ENT:  not assessed    Respiratory:  Negative    Cardiovascular:  Negative    Gastroenterology: not assessed    Genitourinary:  not assessed    Musculoskeletal:  not assessed    Neurologic:  not assessed    Psychiatric:  not assessed    Heme/Lymph/Imm:  not assessed    Endocrine:  not assessed           Physical Exam:   Vitals: /72 (BP Location: Right arm, Patient Position: Sitting, Cuff Size: Adult Regular)   Pulse 60   Ht 1.803 m (5' 11\")   Wt 84 kg (185 lb 1.6 oz)   SpO2 96%   BMI 25.82 kg/m     Wt Readings from Last 4 Encounters:   02/09/24 84 kg (185 lb 1.6 oz)   01/19/22 81.1 kg (178 lb 12.8 oz)   09/04/19 80 kg (176 lb 6.4 oz)   01/03/18 81.6 kg (180 lb)     GEN: well nourished, in no acute distress.  HEENT:  Pupils equal, round. Sclerae nonicteric.   NECK: Supple, no masses appreciated.   C/V:  Regular rate and rhythm, no murmur, rub or gallop.    RESP: Respirations are unlabored. Clear to auscultation bilaterally without wheezing, rales, or rhonchi.  GI: Abdomen soft, nontender.  EXTREM: No LE edema.  NEURO: Alert and oriented, cooperative.  SKIN: Warm and dry.        Data:       LIPID RESULTS:  Lab Results   Component Value Date    CHOL 158 01/18/2022    CHOL 217 (H) 09/04/2019    HDL 68 01/18/2022    HDL 66 09/04/2019    LDL 75 01/18/2022     (H) 09/04/2019    TRIG 76 01/18/2022    TRIG 162 (H) 09/04/2019    CHOLHDLRATIO 3.4 02/09/2007     LIVER " "ENZYME RESULTS:  Lab Results   Component Value Date    AST 42 01/14/2009    ALT 34 09/04/2019     CBC RESULTS:  Lab Results   Component Value Date    WBC 4.5 02/10/2016    RBC 4.92 02/10/2016    HGB 15.0 02/10/2016    HCT 45.1 02/10/2016    MCV 92 02/10/2016    MCH 30.5 02/10/2016    MCHC 33.3 02/10/2016    RDW 12.2 02/10/2016     02/10/2016     BMP RESULTS:  Lab Results   Component Value Date     01/14/2009    POTASSIUM 4.2 01/14/2009    CHLORIDE 102 01/14/2009    CO2 31 01/14/2009    ANIONGAP 10 01/14/2009    GLC 88 02/10/2016    BUN 13 01/14/2009    CR 1.04 01/14/2009    GFRESTIMATED 78 01/14/2009    GFRESTBLACK >90 01/14/2009    YURY 8.8 01/14/2009      A1C RESULTS:  No results found for: \"A1C\"  INR RESULTS:  No results found for: \"INR\"         Medications     Current Outpatient Medications   Medication Sig Dispense Refill    atorvastatin (LIPITOR) 10 MG tablet Take 1 tablet (10 mg) by mouth daily No further refills until seen by cardiology--call [phone] to schedule 90 tablet 3    gabapentin (NEURONTIN) 100 MG capsule TAKE 1 TO 3 CAPSULES BY MOUTH EVERY NIGHT 2 HOURS BEFORE BEDTIME 90 capsule 0    gabapentin (NEURONTIN) 300 MG capsule Take 300 mg by mouth 3 times daily            Past Medical History   History reviewed. No pertinent past medical history.  Past Surgical History:   Procedure Laterality Date    ZZC APPENDECTOMY      age 13     Family History   Problem Relation Age of Onset    Hypertension Father     C.A.D. Father         quintuple bypass (age 53); pacemaker    Anesthesia Reaction Father     Arthritis Father     Hyperlipidemia Father     Asthma Mother     Thyroid Disease Mother     Arthritis Mother     Mental Illness Mother     Asthma Sister     Mental Illness Sister     Alcoholism Sister     Coronary Artery Disease Sister     Coronary Artery Disease Brother     Coronary Artery Disease Paternal Grandfather     Asthma Brother     Coronary Artery Disease Brother         diagnosed with " CAD with calcium scoring    Mental Illness Brother     Coronary Artery Disease Early Onset Brother     Cancer Paternal Aunt         brain    Cancer Paternal Uncle         lung            Allergies   No known drug allergy and Seasonal allergies      Isabella Hernandez NP  [facility] CARE  Pager: [phone]     Thank you for allowing me to participate in the care of your patient.    Sincerely,   Isabella Hernandez NP   [facility]  cc: No referring provider defined for this encounter.

## 2024-02-09 NOTE — PROGRESS NOTES
Cardiology Clinic Progress Note  Miller Burkett MRN# [id number]   YOB: 1965 Age: 58 year old   Primary Cardiologist: Dr. De Santiago Reason for visit: Annual follow up            Assessment and Plan:       1.  Coronary artery calcification       FH of CAD  -12/2017 coronary calcium score 318: , circumflex 44, RCA 98  -12/2017 treadmill stress test: normal with no evidence of stress induced ischemia, patient exercised 14 minutes  -No current anginal symptoms     2.  Hyperlipidemia, goal LDL <70  -1/2022 LDL 75      Plan:  FLP/ALT today as patient is fasting   Continue atorvastatin 10mg daily     Follow up plan: Follow up with Dr. De Santiago in 2 years or sooner if needed        History of Presenting Illness:    Miller Burkett is a very pleasant 58 year old male with a history of elevated coronary calcium score and strong family history of coronary artery disease.  Patient is a non-smoker, normotensive.  His family history of coronary disease includes a father with coronary artery bypass grafting at age 53, 2 brothers with elevated coronary calcium scores, 1 of which underwent a stenting procedure.    Patient had a coronary calcium score done in December 2017 which was 318 placing him in the 96 percentile compared to age and gender matched control groups.  A treadmill stress test done later that month showed no ischemia on the EKG and patient did not have any symptoms.    Patient was seen by Dr. De Santiago in January 2022 at which point he was doing well from a cardiovascular perspective.  They discussed aspirin therapy, however recommendation was not to continue it at that time as felt to be a net neutral benefit.    Patient is here today for a follow up. Patient reports feeling good. Swims 30 minutes a few times per week, runs 20-40 minutes. Exercising a total of about 3-4 hours per week. During and after exercise he has no cardiac symptoms.     Patient denies chest pain or chest tightness. Denies  dizziness, lightheadedness or other presyncopal symptoms. Denies tachycardia or palpitations.  Denies shortness of breath at rest or with exertion.     Blood pressure 122/72 and HR 60 in clinic today.    He has been compliant with his atorvastatin without any myalgias.     No tobacco use. Drinks wine 3 times per week, 2 glasses. Follows a whole food diet, lean proteins, primarily cooks at home.         Recent Hospitalizations   None in [id number]          Social History    , has 3 children, ages 30, 28, 24   Social History     Socioeconomic History    Marital status:      Spouse name: Not on file    Number of children: Not on file    Years of education: Not on file    Highest education level: Not on file   Occupational History    Occupation: copyright      Employer: [employer]   Tobacco Use    Smoking status: Never    Smokeless tobacco: Never   Substance and Sexual Activity    Alcohol use: Yes     Comment: 2-3 drinks per wk    Drug use: No    Sexual activity: Yes     Partners: Female     Birth control/protection: Male Surgical   Other Topics Concern    Parent/sibling w/ CABG, MI or angioplasty before 65F 55M? Not Asked   Social History Narrative    Balanced Diet - Yes    Osteoporosis Preventative measures-  Dairy servings per day:  4    Regular Exercise -  Yes Describe swim, bike, and run    Dental Exam up - YES - Date: 1/2007    Eye Exam - NO    Self Testicular Exam -  Not every month q 3 months    Do you have any concerns about STD's -  No    Abuse: Current or Past (Physical, Sexual or Emotional)- No    Do you feel safe in your environment - Yes    Guns stored in the home - No    Sunscreen used - Yes    Seatbelts used - Yes    Lipids - NO    Glucose -  NO    Colon Cancer Screening - No    Hemoccults - YES    PSA - UNKNOWN    Digital Rectal Exam - UNKNOWN    Immunizations reviewed and up to date - unknown    WILLIAM Carrillo MA     Social Determinants of Health     Financial Resource  "Strain: Not on file   Food Insecurity: Not on file   Transportation Needs: Not on file   Physical Activity: Not on file   Stress: Not on file   Social Connections: Not on file   Interpersonal Safety: Not on file   Housing Stability: Not on file            Review of Systems:   Skin:  not assessed     Eyes:  not assessed    ENT:  not assessed    Respiratory:  Negative    Cardiovascular:  Negative    Gastroenterology: not assessed    Genitourinary:  not assessed    Musculoskeletal:  not assessed    Neurologic:  not assessed    Psychiatric:  not assessed    Heme/Lymph/Imm:  not assessed    Endocrine:  not assessed           Physical Exam:   Vitals: /72 (BP Location: Right arm, Patient Position: Sitting, Cuff Size: Adult Regular)   Pulse 60   Ht 1.803 m (5' 11\")   Wt 84 kg (185 lb 1.6 oz)   SpO2 96%   BMI 25.82 kg/m     Wt Readings from Last 4 Encounters:   02/09/24 84 kg (185 lb 1.6 oz)   01/19/22 81.1 kg (178 lb 12.8 oz)   09/04/19 80 kg (176 lb 6.4 oz)   01/03/18 81.6 kg (180 lb)     GEN: well nourished, in no acute distress.  HEENT:  Pupils equal, round. Sclerae nonicteric.   NECK: Supple, no masses appreciated.   C/V:  Regular rate and rhythm, no murmur, rub or gallop.    RESP: Respirations are unlabored. Clear to auscultation bilaterally without wheezing, rales, or rhonchi.  GI: Abdomen soft, nontender.  EXTREM: No LE edema.  NEURO: Alert and oriented, cooperative.  SKIN: Warm and dry.        Data:       LIPID RESULTS:  Lab Results   Component Value Date    CHOL 158 01/18/2022    CHOL 217 (H) 09/04/2019    HDL 68 01/18/2022    HDL 66 09/04/2019    LDL 75 01/18/2022     (H) 09/04/2019    TRIG 76 01/18/2022    TRIG 162 (H) 09/04/2019    CHOLHDLRATIO 3.4 02/09/2007     LIVER ENZYME RESULTS:  Lab Results   Component Value Date    AST 42 01/14/2009    ALT 34 09/04/2019     CBC RESULTS:  Lab Results   Component Value Date    WBC 4.5 02/10/2016    RBC 4.92 02/10/2016    HGB 15.0 02/10/2016    HCT 45.1 " "02/10/2016    MCV 92 02/10/2016    MCH 30.5 02/10/2016    MCHC 33.3 02/10/2016    RDW 12.2 02/10/2016     02/10/2016     BMP RESULTS:  Lab Results   Component Value Date     01/14/2009    POTASSIUM 4.2 01/14/2009    CHLORIDE 102 01/14/2009    CO2 31 01/14/2009    ANIONGAP 10 01/14/2009    GLC 88 02/10/2016    BUN 13 01/14/2009    CR 1.04 01/14/2009    GFRESTIMATED 78 01/14/2009    GFRESTBLACK >90 01/14/2009    YURY 8.8 01/14/2009      A1C RESULTS:  No results found for: \"A1C\"  INR RESULTS:  No results found for: \"INR\"         Medications     Current Outpatient Medications   Medication Sig Dispense Refill    atorvastatin (LIPITOR) 10 MG tablet Take 1 tablet (10 mg) by mouth daily No further refills until seen by cardiology--call [phone] to schedule 90 tablet 3    gabapentin (NEURONTIN) 100 MG capsule TAKE 1 TO 3 CAPSULES BY MOUTH EVERY NIGHT 2 HOURS BEFORE BEDTIME 90 capsule 0    gabapentin (NEURONTIN) 300 MG capsule Take 300 mg by mouth 3 times daily            Past Medical History   History reviewed. No pertinent past medical history.  Past Surgical History:   Procedure Laterality Date    ZZC APPENDECTOMY      age 13     Family History   Problem Relation Age of Onset    Hypertension Father     C.A.D. Father         quintuple bypass (age 53); pacemaker    Anesthesia Reaction Father     Arthritis Father     Hyperlipidemia Father     Asthma Mother     Thyroid Disease Mother     Arthritis Mother     Mental Illness Mother     Asthma Sister     Mental Illness Sister     Alcoholism Sister     Coronary Artery Disease Sister     Coronary Artery Disease Brother     Coronary Artery Disease Paternal Grandfather     Asthma Brother     Coronary Artery Disease Brother         diagnosed with CAD with calcium scoring    Mental Illness Brother     Coronary Artery Disease Early Onset Brother     Cancer Paternal Aunt         brain    Cancer Paternal Uncle         lung            Allergies   No known drug allergy and " Seasonal allergies        Isabella Hernandez NP  [facility] HEART CARE  Pager: [phone]

## 2024-02-12 ENCOUNTER — TELEPHONE (OUTPATIENT)
Dept: CARDIOLOGY | Facility: CLINIC | Age: 59
End: 2024-02-12
Payer: COMMERCIAL

## 2024-02-12 DIAGNOSIS — I25.10 ASCVD (ARTERIOSCLEROTIC CARDIOVASCULAR DISEASE): ICD-10-CM

## 2024-02-12 DIAGNOSIS — E78.5 DYSLIPIDEMIA: ICD-10-CM

## 2024-02-12 DIAGNOSIS — E78.5 HYPERLIPIDEMIA LDL GOAL <70: Primary | ICD-10-CM

## 2024-02-12 RX ORDER — ATORVASTATIN CALCIUM 20 MG/1
20 TABLET, FILM COATED ORAL DAILY
Qty: 30 TABLET | Refills: 2 | Status: SHIPPED | OUTPATIENT
Start: 2024-02-12 | End: 2024-07-31

## 2024-02-12 NOTE — TELEPHONE ENCOUNTER
Results and recommendations sent to patient in a result note.  Prescription E scripted to [facility] and order placed for FLP/ALT in 6 weeks.  Patient advised to call scheduling to arrange labs.  Hima PUTNAM

## 2024-02-12 NOTE — TELEPHONE ENCOUNTER
----- Message from Isabella Hernandez NP sent at 2/9/2024  3:52 PM CST -----  Lipid profile with LDL>goal of 70, remaining values look good. I would like him to increase his atorvastatin to 20mg daily. Thanks.

## 2024-02-15 NOTE — TELEPHONE ENCOUNTER
Patient called back, informed of recommendations from Helena Hernandez NP to increase atorvastatin to 20 mg daily and recheck FLP/ALT in 6 weeks.  Prescription has been sent to [facility] and order placed for FLP/ALT in 6 weeks.  Will message scheduling to call patient to arrange.  ANNABELLA Centeno RN

## 2024-02-15 NOTE — TELEPHONE ENCOUNTER
Attempted to call patient with lipid ALT results and recommendations from Helena Hernandez NP, left message for patient to call back. Hima PUTNAM

## 2024-03-29 ENCOUNTER — LAB (OUTPATIENT)
Dept: LAB | Facility: CLINIC | Age: 59
End: 2024-03-29
Payer: COMMERCIAL

## 2024-03-29 DIAGNOSIS — I25.10 ASCVD (ARTERIOSCLEROTIC CARDIOVASCULAR DISEASE): ICD-10-CM

## 2024-03-29 DIAGNOSIS — E78.5 HYPERLIPIDEMIA LDL GOAL <70: ICD-10-CM

## 2024-03-29 LAB
ALT SERPL W P-5'-P-CCNC: 32 U/L (ref 0–70)
CHOLEST SERPL-MCNC: 156 MG/DL
FASTING STATUS PATIENT QL REPORTED: YES
HDLC SERPL-MCNC: 55 MG/DL
LDLC SERPL CALC-MCNC: 85 MG/DL
NONHDLC SERPL-MCNC: 101 MG/DL
TRIGL SERPL-MCNC: 79 MG/DL

## 2024-03-29 PROCEDURE — 36415 COLL VENOUS BLD VENIPUNCTURE: CPT

## 2024-03-29 PROCEDURE — 84460 ALANINE AMINO (ALT) (SGPT): CPT

## 2024-03-29 PROCEDURE — 80061 LIPID PANEL: CPT

## 2024-06-22 ENCOUNTER — HEALTH MAINTENANCE LETTER (OUTPATIENT)
Age: 59
End: 2024-06-22

## 2024-07-31 DIAGNOSIS — E78.5 HYPERLIPIDEMIA LDL GOAL <70: ICD-10-CM

## 2024-07-31 DIAGNOSIS — I25.10 ASCVD (ARTERIOSCLEROTIC CARDIOVASCULAR DISEASE): ICD-10-CM

## 2024-07-31 RX ORDER — ATORVASTATIN CALCIUM 20 MG/1
20 TABLET, FILM COATED ORAL DAILY
Qty: 30 TABLET | Refills: 7 | Status: SHIPPED | OUTPATIENT
Start: 2024-07-31

## 2025-04-07 DIAGNOSIS — I25.10 ASCVD (ARTERIOSCLEROTIC CARDIOVASCULAR DISEASE): ICD-10-CM

## 2025-04-07 DIAGNOSIS — E78.5 HYPERLIPIDEMIA LDL GOAL <70: ICD-10-CM

## 2025-04-07 RX ORDER — ATORVASTATIN CALCIUM 20 MG/1
20 TABLET, FILM COATED ORAL DAILY
Qty: 30 TABLET | Refills: 11 | Status: SHIPPED | OUTPATIENT
Start: 2025-04-07

## 2025-07-12 ENCOUNTER — HEALTH MAINTENANCE LETTER (OUTPATIENT)
Age: 60
End: 2025-07-12